# Patient Record
Sex: FEMALE | Race: WHITE | Employment: OTHER | URBAN - METROPOLITAN AREA
[De-identification: names, ages, dates, MRNs, and addresses within clinical notes are randomized per-mention and may not be internally consistent; named-entity substitution may affect disease eponyms.]

---

## 2017-04-26 ENCOUNTER — HOSPITAL ENCOUNTER (OUTPATIENT)
Age: 32
Setting detail: SPECIMEN
Discharge: HOME | End: 2017-04-26
Payer: COMMERCIAL

## 2017-04-28 LAB — DERMATOLOGY PATHOLOGY REPORT: NORMAL

## 2017-05-13 ENCOUNTER — HOSPITAL ENCOUNTER (OUTPATIENT)
Facility: CLINIC | Age: 32
Discharge: HOME | End: 2017-05-13
Payer: COMMERCIAL

## 2017-05-13 LAB
T3 FREE: 4.36 PG/ML (ref 2.02–4.43)
THYROXINE, FREE: 1.53 NG/DL (ref 0.93–1.7)
TSH SERPL DL<=0.05 MIU/L-ACNC: 1.6 MIU/L (ref 0.3–5)

## 2017-05-13 PROCEDURE — 84481 FREE ASSAY (FT-3): CPT

## 2017-05-13 PROCEDURE — 84443 ASSAY THYROID STIM HORMONE: CPT

## 2017-05-13 PROCEDURE — 36415 COLL VENOUS BLD VENIPUNCTURE: CPT

## 2017-05-13 PROCEDURE — 84439 ASSAY OF FREE THYROXINE: CPT

## 2017-07-29 ENCOUNTER — HOSPITAL ENCOUNTER (OUTPATIENT)
Facility: CLINIC | Age: 32
Discharge: HOME | End: 2017-07-29
Payer: COMMERCIAL

## 2017-07-29 LAB
T3 FREE: 3.75 PG/ML (ref 2.02–4.43)
THYROXINE, FREE: 1.55 NG/DL (ref 0.93–1.7)
TSH SERPL DL<=0.05 MIU/L-ACNC: 0.98 MIU/L (ref 0.3–5)

## 2017-07-29 PROCEDURE — 84481 FREE ASSAY (FT-3): CPT

## 2017-07-29 PROCEDURE — 84443 ASSAY THYROID STIM HORMONE: CPT

## 2017-07-29 PROCEDURE — 84439 ASSAY OF FREE THYROXINE: CPT

## 2017-07-29 PROCEDURE — 36415 COLL VENOUS BLD VENIPUNCTURE: CPT

## 2017-08-31 ENCOUNTER — HOSPITAL ENCOUNTER (OUTPATIENT)
Facility: CLINIC | Age: 32
Discharge: HOME | End: 2017-08-31
Payer: COMMERCIAL

## 2017-08-31 LAB
T3 FREE: 4.1 PG/ML (ref 2.02–4.43)
THYROXINE, FREE: 1.72 NG/DL (ref 0.93–1.7)
TSH SERPL DL<=0.05 MIU/L-ACNC: 0.58 MIU/L (ref 0.3–5)

## 2017-08-31 PROCEDURE — 84443 ASSAY THYROID STIM HORMONE: CPT

## 2017-08-31 PROCEDURE — 84439 ASSAY OF FREE THYROXINE: CPT

## 2017-08-31 PROCEDURE — 36415 COLL VENOUS BLD VENIPUNCTURE: CPT

## 2017-08-31 PROCEDURE — 84481 FREE ASSAY (FT-3): CPT

## 2017-11-13 NOTE — PROGRESS NOTES
Subjective:      Patient ID: Primo Anton is a 28 y.o. female. HPI G 2 Presents for annual pap ,  No complaints of abdominal Pain ,  or GI issues , LMp 17  Every 28 days, lasting 4 days  Try for pregnancy spring 2018   Allergy: sulfa, augmentin, codeine,   Med HX: Hyperthyroid, pre eclampsia   Surgery  x 2     Review of Systems   Constitutional: Negative for chills, fatigue and fever. HENT: Negative for sinus pressure, sneezing, sore throat, tinnitus, trouble swallowing and voice change. Respiratory: Negative for cough, choking, shortness of breath, wheezing and stridor. Cardiovascular: Negative for chest pain, palpitations and leg swelling. Gastrointestinal: Negative for abdominal distention, abdominal pain, constipation, diarrhea, nausea and vomiting. Genitourinary: Negative for decreased urine volume, difficulty urinating, dysuria, flank pain, frequency, genital sores, hematuria, menstrual problem, urgency, vaginal bleeding and vaginal discharge. Musculoskeletal: Negative for arthralgias, back pain, gait problem and joint swelling. Skin: Negative for color change, pallor and rash. Neurological: Negative for tremors, seizures, syncope, facial asymmetry, speech difficulty, numbness and headaches. Hematological: Negative for adenopathy. Does not bruise/bleed easily. Psychiatric/Behavioral: Negative for agitation, behavioral problems, confusion, dysphoric mood, self-injury and sleep disturbance. The patient is not nervous/anxious and is not hyperactive. Objective:   Physical Exam   Constitutional: She is oriented to person, place, and time. She appears well-developed and well-nourished. HENT:   Head: Normocephalic. Right Ear: External ear normal.   Left Ear: External ear normal.   Eyes: Conjunctivae are normal. Pupils are equal, round, and reactive to light. Neck: Normal range of motion. Neck supple. No thyromegaly present.    Cardiovascular: Normal rate,

## 2017-11-15 ENCOUNTER — HOSPITAL ENCOUNTER (OUTPATIENT)
Age: 32
Setting detail: SPECIMEN
Discharge: HOME | End: 2017-11-15
Payer: COMMERCIAL

## 2017-11-15 ENCOUNTER — OFFICE VISIT (OUTPATIENT)
Dept: OBGYN CLINIC | Age: 32
End: 2017-11-15
Payer: COMMERCIAL

## 2017-11-15 VITALS
DIASTOLIC BLOOD PRESSURE: 64 MMHG | RESPIRATION RATE: 14 BRPM | WEIGHT: 125 LBS | SYSTOLIC BLOOD PRESSURE: 112 MMHG | HEIGHT: 64 IN | BODY MASS INDEX: 21.34 KG/M2

## 2017-11-15 DIAGNOSIS — Z01.419 PAP SMEAR, AS PART OF ROUTINE GYNECOLOGICAL EXAMINATION: Primary | ICD-10-CM

## 2017-11-15 PROCEDURE — G8427 DOCREV CUR MEDS BY ELIG CLIN: HCPCS | Performed by: NURSE PRACTITIONER

## 2017-11-15 PROCEDURE — 99395 PREV VISIT EST AGE 18-39: CPT | Performed by: NURSE PRACTITIONER

## 2017-11-15 PROCEDURE — G8484 FLU IMMUNIZE NO ADMIN: HCPCS | Performed by: NURSE PRACTITIONER

## 2017-11-15 PROCEDURE — 1036F TOBACCO NON-USER: CPT | Performed by: NURSE PRACTITIONER

## 2017-11-15 PROCEDURE — G8420 CALC BMI NORM PARAMETERS: HCPCS | Performed by: NURSE PRACTITIONER

## 2017-11-15 ASSESSMENT — ENCOUNTER SYMPTOMS
CONSTIPATION: 0
VOMITING: 0
VOICE CHANGE: 0
NAUSEA: 0
COLOR CHANGE: 0
CHOKING: 0
TROUBLE SWALLOWING: 0
COUGH: 0
SINUS PRESSURE: 0
SHORTNESS OF BREATH: 0
WHEEZING: 0
BACK PAIN: 0
ABDOMINAL PAIN: 0
DIARRHEA: 0
SORE THROAT: 0
ABDOMINAL DISTENTION: 0
STRIDOR: 0

## 2017-11-16 LAB
HPV SAMPLE: NORMAL
HPV SOURCE: NORMAL
HPV, GENOTYPE 16: NOT DETECTED
HPV, GENOTYPE 18: NOT DETECTED
HPV, HIGH RISK OTHER: NOT DETECTED
HPV, INTERPRETATION: NORMAL

## 2017-11-22 LAB — CYTOLOGY REPORT: NORMAL

## 2017-12-11 ENCOUNTER — HOSPITAL ENCOUNTER (OUTPATIENT)
Facility: CLINIC | Age: 32
Discharge: HOME | End: 2017-12-11
Payer: COMMERCIAL

## 2017-12-11 LAB
T3 FREE: 3.43 PG/ML (ref 2.02–4.43)
THYROXINE, FREE: 1.62 NG/DL (ref 0.93–1.7)
TSH SERPL DL<=0.05 MIU/L-ACNC: 0.35 MIU/L (ref 0.3–5)

## 2017-12-11 PROCEDURE — 84443 ASSAY THYROID STIM HORMONE: CPT

## 2017-12-11 PROCEDURE — 84439 ASSAY OF FREE THYROXINE: CPT

## 2017-12-11 PROCEDURE — 36415 COLL VENOUS BLD VENIPUNCTURE: CPT

## 2017-12-11 PROCEDURE — 84481 FREE ASSAY (FT-3): CPT

## 2018-04-30 ENCOUNTER — INITIAL PRENATAL (OUTPATIENT)
Dept: OBGYN CLINIC | Age: 33
End: 2018-04-30
Payer: COMMERCIAL

## 2018-04-30 ENCOUNTER — HOSPITAL ENCOUNTER (OUTPATIENT)
Age: 33
Setting detail: SPECIMEN
Discharge: HOME | End: 2018-04-30
Payer: COMMERCIAL

## 2018-04-30 VITALS
DIASTOLIC BLOOD PRESSURE: 70 MMHG | BODY MASS INDEX: 22.31 KG/M2 | HEART RATE: 80 BPM | SYSTOLIC BLOOD PRESSURE: 118 MMHG | WEIGHT: 130 LBS

## 2018-04-30 DIAGNOSIS — E05.90 HYPERTHYROIDISM AFFECTING PREGNANCY IN FIRST TRIMESTER: Primary | ICD-10-CM

## 2018-04-30 DIAGNOSIS — Z3A.09 9 WEEKS GESTATION OF PREGNANCY: ICD-10-CM

## 2018-04-30 DIAGNOSIS — O99.281 HYPERTHYROIDISM AFFECTING PREGNANCY IN FIRST TRIMESTER: Primary | ICD-10-CM

## 2018-04-30 DIAGNOSIS — O09.299 H/O PRE-ECLAMPSIA IN PRIOR PREGNANCY, CURRENTLY PREGNANT: ICD-10-CM

## 2018-04-30 DIAGNOSIS — O34.219 PREVIOUS CESAREAN DELIVERY AFFECTING PREGNANCY, ANTEPARTUM: ICD-10-CM

## 2018-04-30 LAB
DIRECT EXAM: NORMAL
Lab: NORMAL
SPECIMEN DESCRIPTION: NORMAL
STATUS: NORMAL

## 2018-04-30 PROCEDURE — 0500F INITIAL PRENATAL CARE VISIT: CPT | Performed by: NURSE PRACTITIONER

## 2018-05-01 ENCOUNTER — HOSPITAL ENCOUNTER (OUTPATIENT)
Age: 33
Discharge: HOME | End: 2018-05-01
Payer: COMMERCIAL

## 2018-05-01 ENCOUNTER — HOSPITAL ENCOUNTER (OUTPATIENT)
Dept: ULTRASOUND IMAGING | Facility: CLINIC | Age: 33
Discharge: HOME | End: 2018-05-03
Payer: COMMERCIAL

## 2018-05-01 DIAGNOSIS — O99.281 HYPERTHYROIDISM AFFECTING PREGNANCY IN FIRST TRIMESTER: ICD-10-CM

## 2018-05-01 DIAGNOSIS — E05.90 HYPERTHYROIDISM AFFECTING PREGNANCY IN FIRST TRIMESTER: ICD-10-CM

## 2018-05-01 DIAGNOSIS — Z3A.09 9 WEEKS GESTATION OF PREGNANCY: ICD-10-CM

## 2018-05-01 LAB
ABO/RH: NORMAL
ABSOLUTE EOS #: 0.2 K/UL (ref 0–0.44)
ABSOLUTE IMMATURE GRANULOCYTE: 0.06 K/UL (ref 0–0.3)
ABSOLUTE LYMPH #: 1.59 K/UL (ref 1.1–3.7)
ABSOLUTE MONO #: 0.63 K/UL (ref 0.1–1.2)
ANTIBODY SCREEN: NEGATIVE
BASOPHILS # BLD: 0 % (ref 0–2)
BASOPHILS ABSOLUTE: 0.03 K/UL (ref 0–0.2)
BILIRUBIN URINE: NEGATIVE
C TRACH DNA GENITAL QL NAA+PROBE: NEGATIVE
COLOR: YELLOW
COMMENT UA: NORMAL
DIFFERENTIAL TYPE: ABNORMAL
EOSINOPHILS RELATIVE PERCENT: 2 % (ref 1–4)
GLUCOSE BLD-MCNC: 104 MG/DL (ref 70–99)
GLUCOSE URINE: NEGATIVE
HCG QUANTITATIVE: ABNORMAL IU/L
HCT VFR BLD CALC: 38.4 % (ref 36.3–47.1)
HEMOGLOBIN: 12.5 G/DL (ref 11.9–15.1)
HEPATITIS B SURFACE ANTIGEN: NONREACTIVE
HIV AG/AB: NONREACTIVE
IMMATURE GRANULOCYTES: 1 %
KETONES, URINE: NEGATIVE
LEUKOCYTE ESTERASE, URINE: NEGATIVE
LYMPHOCYTES # BLD: 13 % (ref 24–43)
MCH RBC QN AUTO: 31.5 PG (ref 25.2–33.5)
MCHC RBC AUTO-ENTMCNC: 32.6 G/DL (ref 28.4–34.8)
MCV RBC AUTO: 96.7 FL (ref 82.6–102.9)
MONOCYTES # BLD: 5 % (ref 3–12)
N. GONORRHOEAE DNA: NEGATIVE
NITRITE, URINE: NEGATIVE
NRBC AUTOMATED: 0 PER 100 WBC
PDW BLD-RTO: 11.8 % (ref 11.8–14.4)
PH UA: 7 (ref 5–8)
PLATELET # BLD: 381 K/UL (ref 138–453)
PLATELET ESTIMATE: ABNORMAL
PMV BLD AUTO: 9.6 FL (ref 8.1–13.5)
PROTEIN UA: NEGATIVE
RBC # BLD: 3.97 M/UL (ref 3.95–5.11)
RBC # BLD: ABNORMAL 10*6/UL
RUBV IGG SER QL: 139.5 IU/ML
SEG NEUTROPHILS: 79 % (ref 36–65)
SEGMENTED NEUTROPHILS ABSOLUTE COUNT: 9.64 K/UL (ref 1.5–8.1)
SPECIFIC GRAVITY UA: 1.01 (ref 1–1.03)
T. PALLIDUM, IGG: NONREACTIVE
TSH SERPL DL<=0.05 MIU/L-ACNC: 0.03 MIU/L (ref 0.3–5)
TURBIDITY: CLEAR
URINE HGB: NEGATIVE
UROBILINOGEN, URINE: NORMAL
WBC # BLD: 12.2 K/UL (ref 3.5–11.3)
WBC # BLD: ABNORMAL 10*3/UL

## 2018-05-01 PROCEDURE — 84702 CHORIONIC GONADOTROPIN TEST: CPT

## 2018-05-01 PROCEDURE — 87389 HIV-1 AG W/HIV-1&-2 AB AG IA: CPT

## 2018-05-01 PROCEDURE — 86780 TREPONEMA PALLIDUM: CPT

## 2018-05-01 PROCEDURE — 86850 RBC ANTIBODY SCREEN: CPT

## 2018-05-01 PROCEDURE — 85025 COMPLETE CBC W/AUTO DIFF WBC: CPT

## 2018-05-01 PROCEDURE — 81003 URINALYSIS AUTO W/O SCOPE: CPT

## 2018-05-01 PROCEDURE — 86900 BLOOD TYPING SEROLOGIC ABO: CPT

## 2018-05-01 PROCEDURE — 86762 RUBELLA ANTIBODY: CPT

## 2018-05-01 PROCEDURE — 86901 BLOOD TYPING SEROLOGIC RH(D): CPT

## 2018-05-01 PROCEDURE — 36415 COLL VENOUS BLD VENIPUNCTURE: CPT

## 2018-05-01 PROCEDURE — 76801 OB US < 14 WKS SINGLE FETUS: CPT

## 2018-05-01 PROCEDURE — 84443 ASSAY THYROID STIM HORMONE: CPT

## 2018-05-01 PROCEDURE — 87086 URINE CULTURE/COLONY COUNT: CPT

## 2018-05-01 PROCEDURE — 87340 HEPATITIS B SURFACE AG IA: CPT

## 2018-05-01 PROCEDURE — 82947 ASSAY GLUCOSE BLOOD QUANT: CPT

## 2018-05-02 ENCOUNTER — TELEPHONE (OUTPATIENT)
Dept: OBGYN CLINIC | Age: 33
End: 2018-05-02

## 2018-05-02 LAB
CULTURE: NO GROWTH
CULTURE: NORMAL
Lab: NORMAL
SPECIMEN DESCRIPTION: NORMAL
STATUS: NORMAL

## 2018-05-03 LAB
CULTURE: NORMAL
CULTURE: NORMAL
Lab: NORMAL
SPECIMEN DESCRIPTION: NORMAL
STATUS: NORMAL

## 2018-05-24 ENCOUNTER — ROUTINE PRENATAL (OUTPATIENT)
Dept: OBGYN CLINIC | Age: 33
End: 2018-05-24

## 2018-05-24 VITALS
SYSTOLIC BLOOD PRESSURE: 116 MMHG | WEIGHT: 134 LBS | DIASTOLIC BLOOD PRESSURE: 70 MMHG | HEART RATE: 80 BPM | BODY MASS INDEX: 23 KG/M2

## 2018-05-24 DIAGNOSIS — Z3A.13 13 WEEKS GESTATION OF PREGNANCY: ICD-10-CM

## 2018-05-24 DIAGNOSIS — E07.9 THYROID DISEASE DURING PREGNANCY IN SECOND TRIMESTER: ICD-10-CM

## 2018-05-24 DIAGNOSIS — Z34.82 NORMAL PREGNANCY IN MULTIGRAVIDA IN SECOND TRIMESTER: Primary | ICD-10-CM

## 2018-05-24 DIAGNOSIS — O99.282 THYROID DISEASE DURING PREGNANCY IN SECOND TRIMESTER: ICD-10-CM

## 2018-05-24 PROCEDURE — G8427 DOCREV CUR MEDS BY ELIG CLIN: HCPCS | Performed by: NURSE PRACTITIONER

## 2018-05-24 PROCEDURE — 0502F SUBSEQUENT PRENATAL CARE: CPT | Performed by: NURSE PRACTITIONER

## 2018-05-24 PROCEDURE — G8420 CALC BMI NORM PARAMETERS: HCPCS | Performed by: NURSE PRACTITIONER

## 2018-05-24 PROCEDURE — 1036F TOBACCO NON-USER: CPT | Performed by: NURSE PRACTITIONER

## 2018-06-11 ENCOUNTER — HOSPITAL ENCOUNTER (OUTPATIENT)
Facility: CLINIC | Age: 33
Discharge: HOME | End: 2018-06-11
Payer: COMMERCIAL

## 2018-06-11 LAB
T3 FREE: 2.84 PG/ML (ref 2.02–4.43)
THYROXINE, FREE: 1.42 NG/DL (ref 0.93–1.7)
TSH SERPL DL<=0.05 MIU/L-ACNC: 0.37 MIU/L (ref 0.3–5)

## 2018-06-11 PROCEDURE — 84481 FREE ASSAY (FT-3): CPT

## 2018-06-11 PROCEDURE — 84443 ASSAY THYROID STIM HORMONE: CPT

## 2018-06-11 PROCEDURE — 36415 COLL VENOUS BLD VENIPUNCTURE: CPT

## 2018-06-11 PROCEDURE — 84439 ASSAY OF FREE THYROXINE: CPT

## 2018-06-18 ENCOUNTER — ROUTINE PRENATAL (OUTPATIENT)
Dept: OBGYN CLINIC | Age: 33
End: 2018-06-18

## 2018-06-18 VITALS — DIASTOLIC BLOOD PRESSURE: 72 MMHG | WEIGHT: 138 LBS | SYSTOLIC BLOOD PRESSURE: 118 MMHG | BODY MASS INDEX: 23.69 KG/M2

## 2018-06-18 DIAGNOSIS — Z3A.16 16 WEEKS GESTATION OF PREGNANCY: ICD-10-CM

## 2018-06-18 DIAGNOSIS — Z34.82 NORMAL PREGNANCY IN MULTIGRAVIDA IN SECOND TRIMESTER: Primary | ICD-10-CM

## 2018-06-18 PROCEDURE — 0502F SUBSEQUENT PRENATAL CARE: CPT | Performed by: OBSTETRICS & GYNECOLOGY

## 2018-07-17 ENCOUNTER — ROUTINE PRENATAL (OUTPATIENT)
Dept: PERINATAL CARE | Age: 33
End: 2018-07-17
Payer: COMMERCIAL

## 2018-07-17 VITALS
RESPIRATION RATE: 16 BRPM | BODY MASS INDEX: 24.84 KG/M2 | HEART RATE: 103 BPM | WEIGHT: 145.5 LBS | DIASTOLIC BLOOD PRESSURE: 75 MMHG | SYSTOLIC BLOOD PRESSURE: 120 MMHG | HEIGHT: 64 IN | TEMPERATURE: 98.3 F

## 2018-07-17 DIAGNOSIS — Z3A.20 20 WEEKS GESTATION OF PREGNANCY: ICD-10-CM

## 2018-07-17 DIAGNOSIS — O34.219 PREVIOUS CESAREAN DELIVERY, ANTEPARTUM CONDITION OR COMPLICATION: ICD-10-CM

## 2018-07-17 DIAGNOSIS — O09.292 HX OF PREECLAMPSIA, PRIOR PREGNANCY, CURRENTLY PREGNANT, SECOND TRIMESTER: ICD-10-CM

## 2018-07-17 DIAGNOSIS — Z36.86 ENCOUNTER FOR SCREENING FOR RISK OF PRE-TERM LABOR: ICD-10-CM

## 2018-07-17 DIAGNOSIS — O44.40 LOW IMPLANTATION OF PLACENTA WITHOUT HEMORRHAGE: Primary | ICD-10-CM

## 2018-07-17 PROCEDURE — 76817 TRANSVAGINAL US OBSTETRIC: CPT | Performed by: OBSTETRICS & GYNECOLOGY

## 2018-07-17 PROCEDURE — 76811 OB US DETAILED SNGL FETUS: CPT | Performed by: OBSTETRICS & GYNECOLOGY

## 2018-07-18 ENCOUNTER — ROUTINE PRENATAL (OUTPATIENT)
Dept: OBGYN CLINIC | Age: 33
End: 2018-07-18

## 2018-07-18 VITALS
DIASTOLIC BLOOD PRESSURE: 62 MMHG | BODY MASS INDEX: 24.95 KG/M2 | SYSTOLIC BLOOD PRESSURE: 108 MMHG | WEIGHT: 145.38 LBS

## 2018-07-18 DIAGNOSIS — Z3A.20 20 WEEKS GESTATION OF PREGNANCY: ICD-10-CM

## 2018-07-18 DIAGNOSIS — O44.40 LOW IMPLANTATION OF PLACENTA WITHOUT HEMORRHAGE: ICD-10-CM

## 2018-07-18 DIAGNOSIS — Z34.82 ENCOUNTER FOR SUPERVISION OF OTHER NORMAL PREGNANCY IN SECOND TRIMESTER: Primary | ICD-10-CM

## 2018-07-18 PROCEDURE — 0502F SUBSEQUENT PRENATAL CARE: CPT | Performed by: OBSTETRICS & GYNECOLOGY

## 2018-08-13 ENCOUNTER — ROUTINE PRENATAL (OUTPATIENT)
Dept: OBGYN CLINIC | Age: 33
End: 2018-08-13

## 2018-08-13 VITALS — WEIGHT: 154 LBS | BODY MASS INDEX: 26.43 KG/M2 | SYSTOLIC BLOOD PRESSURE: 110 MMHG | DIASTOLIC BLOOD PRESSURE: 62 MMHG

## 2018-08-13 DIAGNOSIS — Z34.82 NORMAL PREGNANCY IN MULTIGRAVIDA IN SECOND TRIMESTER: Primary | ICD-10-CM

## 2018-08-13 DIAGNOSIS — Z3A.24 24 WEEKS GESTATION OF PREGNANCY: ICD-10-CM

## 2018-08-13 PROCEDURE — 0502F SUBSEQUENT PRENATAL CARE: CPT | Performed by: OBSTETRICS & GYNECOLOGY

## 2018-08-13 NOTE — PROGRESS NOTES
Lukasz Richardson is a 28 y.o. female 24w4d        OB History    Para Term  AB Living   3 2 2 0 0 2   SAB TAB Ectopic Molar Multiple Live Births   0 0 0   0 2      # Outcome Date GA Lbr Usman/2nd Weight Sex Delivery Anes PTL Lv   3 Current            2 Term 02/03/15 38w0d  7 lb 12.5 oz (3.53 kg) M CS-LTranv Spinal  MARLEN   1 Term 2014 39w0d  8 lb (3.629 kg) F CS-LTranv  N MARLEN          Vitals  BP: 110/62  Weight: 154 lb (69.9 kg)  Patient Position: Sitting  Albumin: Negative  Glucose: Negative    The patient was seen and evaluated. There was positive fetal movements. No contractions or leakage of fluid. Signs and symptoms of  labor as well as labor were reviewed. The patients anatomy ultrasound has been completed and reviewed with patient. TOP ST OH Reviewed. A 28 week lab panel was ordered. This includes a (HH, 1 hr GTT, U/A C&S). The patient is to complete this in the next two to four weeks. The S/S of Pre-Eclampsia were reviewed with the patient in detail. She is to report any of these if they occur. She currently denies any of these. The patient is RH positive Rhogam Ordered no    The patient was instructed on fetal kick counts and was given a kick sheet to complete every 8 hours. This is to begin at 28 weeks gestation. She was instructed that the baby should move at a minimum of ten times within one hour after a meal. The patient was instructed to lay down on her left side twenty minutes after eating and count movements for up to one hour with a target value of ten movements. She was instructed to notify the office if she did not make that target after two attempts or if after any attempt there was less than four movements. No Patient Care Coordination Note on file. Assessment:  1. Lukasz Richardson is a 28 y.o. female  2.  [id number]  3. 24w4d    Patient Active Problem List    Diagnosis Date Noted    Preeclampsia 2015     Priority: High    S/P RLTCS 2/3/15 M

## 2018-09-11 ENCOUNTER — HOSPITAL ENCOUNTER (OUTPATIENT)
Facility: CLINIC | Age: 33
Discharge: HOME | End: 2018-09-11
Payer: COMMERCIAL

## 2018-09-11 DIAGNOSIS — Z34.82 NORMAL PREGNANCY IN MULTIGRAVIDA IN SECOND TRIMESTER: ICD-10-CM

## 2018-09-11 DIAGNOSIS — Z3A.24 24 WEEKS GESTATION OF PREGNANCY: ICD-10-CM

## 2018-09-11 PROBLEM — O99.013 ANEMIA DURING PREGNANCY IN THIRD TRIMESTER: Status: ACTIVE | Noted: 2018-09-11

## 2018-09-11 LAB
ABSOLUTE EOS #: 0.28 K/UL (ref 0–0.44)
ABSOLUTE IMMATURE GRANULOCYTE: 0.08 K/UL (ref 0–0.3)
ABSOLUTE LYMPH #: 1.57 K/UL (ref 1.1–3.7)
ABSOLUTE MONO #: 0.47 K/UL (ref 0.1–1.2)
BASOPHILS # BLD: 0 % (ref 0–2)
BASOPHILS ABSOLUTE: <0.03 K/UL (ref 0–0.2)
DIFFERENTIAL TYPE: ABNORMAL
EOSINOPHILS RELATIVE PERCENT: 3 % (ref 1–4)
GLUCOSE ADMINISTRATION: NORMAL
GLUCOSE TOLERANCE SCREEN 50G: 130 MG/DL (ref 70–135)
HCT VFR BLD CALC: 31.6 % (ref 36.3–47.1)
HEMOGLOBIN: 10.1 G/DL (ref 11.9–15.1)
IMMATURE GRANULOCYTES: 1 %
LYMPHOCYTES # BLD: 19 % (ref 24–43)
MCH RBC QN AUTO: 31.4 PG (ref 25.2–33.5)
MCHC RBC AUTO-ENTMCNC: 32 G/DL (ref 28.4–34.8)
MCV RBC AUTO: 98.1 FL (ref 82.6–102.9)
MONOCYTES # BLD: 6 % (ref 3–12)
NRBC AUTOMATED: 0 PER 100 WBC
PDW BLD-RTO: 12.3 % (ref 11.8–14.4)
PLATELET # BLD: 300 K/UL (ref 138–453)
PLATELET ESTIMATE: ABNORMAL
PMV BLD AUTO: 10.2 FL (ref 8.1–13.5)
RBC # BLD: 3.22 M/UL (ref 3.95–5.11)
RBC # BLD: ABNORMAL 10*6/UL
SEG NEUTROPHILS: 71 % (ref 36–65)
SEGMENTED NEUTROPHILS ABSOLUTE COUNT: 6.08 K/UL (ref 1.5–8.1)
TSH SERPL DL<=0.05 MIU/L-ACNC: 0.73 MIU/L (ref 0.3–5)
WBC # BLD: 8.5 K/UL (ref 3.5–11.3)
WBC # BLD: ABNORMAL 10*3/UL

## 2018-09-11 PROCEDURE — 36415 COLL VENOUS BLD VENIPUNCTURE: CPT

## 2018-09-11 PROCEDURE — 85025 COMPLETE CBC W/AUTO DIFF WBC: CPT

## 2018-09-11 PROCEDURE — 84443 ASSAY THYROID STIM HORMONE: CPT

## 2018-09-11 PROCEDURE — 82950 GLUCOSE TEST: CPT

## 2018-09-12 ENCOUNTER — TELEPHONE (OUTPATIENT)
Dept: OBGYN CLINIC | Age: 33
End: 2018-09-12

## 2018-09-12 RX ORDER — LANOLIN ALCOHOL/MO/W.PET/CERES
325 CREAM (GRAM) TOPICAL 2 TIMES DAILY WITH MEALS
Qty: 60 TABLET | Refills: 3 | Status: SHIPPED | OUTPATIENT
Start: 2018-09-12 | End: 2018-10-25

## 2018-09-20 ENCOUNTER — ROUTINE PRENATAL (OUTPATIENT)
Dept: OBGYN CLINIC | Age: 33
End: 2018-09-20

## 2018-09-20 VITALS
BODY MASS INDEX: 27.12 KG/M2 | DIASTOLIC BLOOD PRESSURE: 62 MMHG | HEART RATE: 80 BPM | WEIGHT: 158 LBS | SYSTOLIC BLOOD PRESSURE: 116 MMHG

## 2018-09-20 DIAGNOSIS — O99.013 ANEMIA DURING PREGNANCY IN THIRD TRIMESTER: ICD-10-CM

## 2018-09-20 DIAGNOSIS — O09.293 HX OF PREECLAMPSIA, PRIOR PREGNANCY, CURRENTLY PREGNANT, THIRD TRIMESTER: ICD-10-CM

## 2018-09-20 DIAGNOSIS — Z34.83 ENCOUNTER FOR SUPERVISION OF NORMAL PREGNANCY IN MULTIGRAVIDA IN THIRD TRIMESTER: Primary | ICD-10-CM

## 2018-09-20 DIAGNOSIS — Z3A.30 30 WEEKS GESTATION OF PREGNANCY: ICD-10-CM

## 2018-09-20 PROCEDURE — 1036F TOBACCO NON-USER: CPT | Performed by: NURSE PRACTITIONER

## 2018-09-20 PROCEDURE — 0502F SUBSEQUENT PRENATAL CARE: CPT | Performed by: NURSE PRACTITIONER

## 2018-09-20 PROCEDURE — G8427 DOCREV CUR MEDS BY ELIG CLIN: HCPCS | Performed by: NURSE PRACTITIONER

## 2018-09-20 PROCEDURE — G8419 CALC BMI OUT NRM PARAM NOF/U: HCPCS | Performed by: NURSE PRACTITIONER

## 2018-09-20 NOTE — PROGRESS NOTES
G 3 P 2   EDC 18, 30 weeks gestation presents for ob visit.  H/o 2 prior c-sections, , Hyperthyroid   Allergy Augmentin, Sulfa, Codeine   Med HX  Hyperthyroid, Pre-eclampsia   Surgery  x 2     She deneis bleeding, discharge ,dysurai headaches, heartburn, epigastric pain, nausea, contractions, leakage of fluid   + fetal movement   Kick Count target met     Q&A varicose veins rt leg,   & perineum    Compression hose   Increase lying down time         RV prn/ 3 week

## 2018-10-09 ENCOUNTER — ROUTINE PRENATAL (OUTPATIENT)
Dept: OBGYN CLINIC | Age: 33
End: 2018-10-09

## 2018-10-09 VITALS
DIASTOLIC BLOOD PRESSURE: 62 MMHG | BODY MASS INDEX: 27.64 KG/M2 | WEIGHT: 161 LBS | HEART RATE: 88 BPM | SYSTOLIC BLOOD PRESSURE: 110 MMHG

## 2018-10-09 DIAGNOSIS — O99.013 ANEMIA DURING PREGNANCY IN THIRD TRIMESTER: ICD-10-CM

## 2018-10-09 DIAGNOSIS — Z3A.32 32 WEEKS GESTATION OF PREGNANCY: ICD-10-CM

## 2018-10-09 DIAGNOSIS — O09.293 HX OF PREECLAMPSIA, PRIOR PREGNANCY, CURRENTLY PREGNANT, THIRD TRIMESTER: ICD-10-CM

## 2018-10-09 DIAGNOSIS — Z34.83 ENCOUNTER FOR SUPERVISION OF NORMAL PREGNANCY IN MULTIGRAVIDA IN THIRD TRIMESTER: Primary | ICD-10-CM

## 2018-10-09 PROCEDURE — G8427 DOCREV CUR MEDS BY ELIG CLIN: HCPCS | Performed by: NURSE PRACTITIONER

## 2018-10-09 PROCEDURE — G8484 FLU IMMUNIZE NO ADMIN: HCPCS | Performed by: NURSE PRACTITIONER

## 2018-10-09 PROCEDURE — G8419 CALC BMI OUT NRM PARAM NOF/U: HCPCS | Performed by: NURSE PRACTITIONER

## 2018-10-09 PROCEDURE — 0502F SUBSEQUENT PRENATAL CARE: CPT | Performed by: NURSE PRACTITIONER

## 2018-10-09 PROCEDURE — 1036F TOBACCO NON-USER: CPT | Performed by: NURSE PRACTITIONER

## 2018-10-25 ENCOUNTER — ROUTINE PRENATAL (OUTPATIENT)
Dept: OBGYN CLINIC | Age: 33
End: 2018-10-25
Payer: COMMERCIAL

## 2018-10-25 VITALS — DIASTOLIC BLOOD PRESSURE: 60 MMHG | BODY MASS INDEX: 27.64 KG/M2 | SYSTOLIC BLOOD PRESSURE: 120 MMHG | WEIGHT: 161 LBS

## 2018-10-25 DIAGNOSIS — Z23 NEED FOR TDAP VACCINATION: Primary | ICD-10-CM

## 2018-10-25 DIAGNOSIS — Z23 FLU VACCINE NEED: ICD-10-CM

## 2018-10-25 DIAGNOSIS — O09.293 HX OF PREECLAMPSIA, PRIOR PREGNANCY, CURRENTLY PREGNANT, THIRD TRIMESTER: ICD-10-CM

## 2018-10-25 DIAGNOSIS — O99.013 ANEMIA DURING PREGNANCY IN THIRD TRIMESTER: ICD-10-CM

## 2018-10-25 DIAGNOSIS — Z34.83 ENCOUNTER FOR SUPERVISION OF NORMAL PREGNANCY IN MULTIGRAVIDA IN THIRD TRIMESTER: ICD-10-CM

## 2018-10-25 DIAGNOSIS — Z98.891 HISTORY OF C-SECTION: ICD-10-CM

## 2018-10-25 DIAGNOSIS — Z3A.35 35 WEEKS GESTATION OF PREGNANCY: ICD-10-CM

## 2018-10-25 PROCEDURE — 1036F TOBACCO NON-USER: CPT | Performed by: OBSTETRICS & GYNECOLOGY

## 2018-10-25 PROCEDURE — 90715 TDAP VACCINE 7 YRS/> IM: CPT | Performed by: OBSTETRICS & GYNECOLOGY

## 2018-10-25 PROCEDURE — G8419 CALC BMI OUT NRM PARAM NOF/U: HCPCS | Performed by: OBSTETRICS & GYNECOLOGY

## 2018-10-25 PROCEDURE — G8427 DOCREV CUR MEDS BY ELIG CLIN: HCPCS | Performed by: OBSTETRICS & GYNECOLOGY

## 2018-10-25 PROCEDURE — 90472 IMMUNIZATION ADMIN EACH ADD: CPT | Performed by: OBSTETRICS & GYNECOLOGY

## 2018-10-25 PROCEDURE — 90471 IMMUNIZATION ADMIN: CPT | Performed by: OBSTETRICS & GYNECOLOGY

## 2018-10-25 PROCEDURE — 90688 IIV4 VACCINE SPLT 0.5 ML IM: CPT | Performed by: OBSTETRICS & GYNECOLOGY

## 2018-10-25 PROCEDURE — 0502F SUBSEQUENT PRENATAL CARE: CPT | Performed by: OBSTETRICS & GYNECOLOGY

## 2018-10-25 PROCEDURE — G8482 FLU IMMUNIZE ORDER/ADMIN: HCPCS | Performed by: OBSTETRICS & GYNECOLOGY

## 2018-10-25 RX ORDER — FERROUS SULFATE 325(65) MG
TABLET ORAL
Refills: 3 | Status: ON HOLD | COMMUNITY
Start: 2018-09-12 | End: 2018-11-18 | Stop reason: HOSPADM

## 2018-10-25 NOTE — PROGRESS NOTES
Diagnosis Orders   1. Need for Tdap vaccination  MS INJECTION,THERAP/PROPH/DIAGNOST, IM OR SUBCUT    Tdap (age 6y and older) IM ([address])   2. Flu vaccine need  MS INJECTION,THERAP/PROPH/DIAGNOST, IM OR SUBCUT    INFLUENZA, QUADV, 3 YRS AND OLDER, IM, MDV, 0.5ML (FLUZONE QUADV)   3. 35 weeks gestation of pregnancy  MS INJECTION,THERAP/PROPH/DIAGNOST, IM OR SUBCUT    MS INJECTION,THERAP/PROPH/DIAGNOST, IM OR SUBCUT    Tdap (age 6y and older) IM (BOOSTRIX)    INFLUENZA, QUADV, 3 YRS AND OLDER, IM, MDV, 0.5ML (FLUZONE QUADV)   4. Hx of preeclampsia, prior pregnancy, currently pregnant, third trimester     5. Encounter for supervision of normal pregnancy in multigravida in third trimester     6. Anemia during pregnancy in third trimester     7. History of        Plan:  The patient will return to the office for her next visit in 2 weeks. See antepartum flow sheet. Counseled on s/s of preeclampsia. Counseled on s/s of  labor. [address] discussed in detail  Plan on repeat c/s at 39 weeks. History of 38 week delivery for gest HTN.

## 2018-11-05 ENCOUNTER — ROUTINE PRENATAL (OUTPATIENT)
Dept: PERINATAL CARE | Age: 33
End: 2018-11-05
Payer: COMMERCIAL

## 2018-11-05 VITALS
RESPIRATION RATE: 18 BRPM | BODY MASS INDEX: 27.49 KG/M2 | TEMPERATURE: 98.8 F | SYSTOLIC BLOOD PRESSURE: 107 MMHG | HEART RATE: 88 BPM | DIASTOLIC BLOOD PRESSURE: 68 MMHG | WEIGHT: 161 LBS | HEIGHT: 64 IN

## 2018-11-05 DIAGNOSIS — O36.60X0 EXCESSIVE FETAL GROWTH AFFECTING PREGNANCY, ANTEPARTUM, SINGLE OR UNSPECIFIED FETUS: ICD-10-CM

## 2018-11-05 DIAGNOSIS — Z03.72 SUSPECTED PLACENTAL PROBLEM NOT FOUND: ICD-10-CM

## 2018-11-05 DIAGNOSIS — Z3A.36 36 WEEKS GESTATION OF PREGNANCY: ICD-10-CM

## 2018-11-05 DIAGNOSIS — Z13.89 ENCOUNTER FOR ROUTINE SCREENING FOR MALFORMATION USING ULTRASONICS: ICD-10-CM

## 2018-11-05 DIAGNOSIS — O44.40 LOW IMPLANTATION OF PLACENTA WITHOUT HEMORRHAGE: Primary | ICD-10-CM

## 2018-11-05 DIAGNOSIS — O09.293 HX OF PREECLAMPSIA, PRIOR PREGNANCY, CURRENTLY PREGNANT, THIRD TRIMESTER: ICD-10-CM

## 2018-11-05 PROCEDURE — 76819 FETAL BIOPHYS PROFIL W/O NST: CPT | Performed by: OBSTETRICS & GYNECOLOGY

## 2018-11-05 PROCEDURE — 76805 OB US >/= 14 WKS SNGL FETUS: CPT | Performed by: OBSTETRICS & GYNECOLOGY

## 2018-11-05 PROCEDURE — 76817 TRANSVAGINAL US OBSTETRIC: CPT | Performed by: OBSTETRICS & GYNECOLOGY

## 2018-11-07 ENCOUNTER — HOSPITAL ENCOUNTER (OUTPATIENT)
Age: 33
Setting detail: SPECIMEN
Discharge: HOME | End: 2018-11-07
Payer: COMMERCIAL

## 2018-11-07 ENCOUNTER — ROUTINE PRENATAL (OUTPATIENT)
Dept: OBGYN CLINIC | Age: 33
End: 2018-11-07

## 2018-11-07 VITALS — DIASTOLIC BLOOD PRESSURE: 62 MMHG | WEIGHT: 163 LBS | BODY MASS INDEX: 27.98 KG/M2 | SYSTOLIC BLOOD PRESSURE: 116 MMHG

## 2018-11-07 DIAGNOSIS — Z3A.36 36 WEEKS GESTATION OF PREGNANCY: ICD-10-CM

## 2018-11-07 DIAGNOSIS — O09.293 HX OF PREECLAMPSIA, PRIOR PREGNANCY, CURRENTLY PREGNANT, THIRD TRIMESTER: ICD-10-CM

## 2018-11-07 DIAGNOSIS — O36.60X0 EXCESSIVE FETAL GROWTH AFFECTING PREGNANCY, ANTEPARTUM, SINGLE OR UNSPECIFIED FETUS: Primary | ICD-10-CM

## 2018-11-07 DIAGNOSIS — Z34.83 ENCOUNTER FOR SUPERVISION OF NORMAL PREGNANCY IN MULTIGRAVIDA IN THIRD TRIMESTER: ICD-10-CM

## 2018-11-07 PROCEDURE — G8482 FLU IMMUNIZE ORDER/ADMIN: HCPCS | Performed by: NURSE PRACTITIONER

## 2018-11-07 PROCEDURE — 1036F TOBACCO NON-USER: CPT | Performed by: NURSE PRACTITIONER

## 2018-11-07 PROCEDURE — G8419 CALC BMI OUT NRM PARAM NOF/U: HCPCS | Performed by: NURSE PRACTITIONER

## 2018-11-07 PROCEDURE — G8427 DOCREV CUR MEDS BY ELIG CLIN: HCPCS | Performed by: NURSE PRACTITIONER

## 2018-11-07 PROCEDURE — 0502F SUBSEQUENT PRENATAL CARE: CPT | Performed by: NURSE PRACTITIONER

## 2018-11-10 LAB
CULTURE: NORMAL
Lab: NORMAL
SPECIMEN DESCRIPTION: NORMAL
STATUS: NORMAL

## 2018-11-12 ENCOUNTER — ROUTINE PRENATAL (OUTPATIENT)
Dept: OBGYN CLINIC | Age: 33
End: 2018-11-12

## 2018-11-12 VITALS — SYSTOLIC BLOOD PRESSURE: 112 MMHG | BODY MASS INDEX: 28.15 KG/M2 | WEIGHT: 164 LBS | DIASTOLIC BLOOD PRESSURE: 64 MMHG

## 2018-11-12 DIAGNOSIS — O36.60X0 EXCESSIVE FETAL GROWTH AFFECTING PREGNANCY, ANTEPARTUM, SINGLE OR UNSPECIFIED FETUS: ICD-10-CM

## 2018-11-12 DIAGNOSIS — Z34.83 ENCOUNTER FOR SUPERVISION OF NORMAL PREGNANCY IN MULTIGRAVIDA IN THIRD TRIMESTER: Primary | ICD-10-CM

## 2018-11-12 PROCEDURE — 0502F SUBSEQUENT PRENATAL CARE: CPT | Performed by: OBSTETRICS & GYNECOLOGY

## 2018-11-12 NOTE — PROGRESS NOTES
Juma Crisostomo is a 35 y.o. female 37w4d        OB History    Para Term  AB Living   3 2 2 0 0 2   SAB TAB Ectopic Molar Multiple Live Births   0 0 0   0 2      # Outcome Date GA Lbr Usman/2nd Weight Sex Delivery Anes PTL Lv   3 Current            2 Term 02/03/15 38w0d  7 lb 12.5 oz (3.53 kg) M CS-LTranv Spinal  MARLEN   1 Term 2014 39w0d  8 lb (3.629 kg) F CS-LTranv  N MARLEN          Vitals  BP: 112/64  Weight: 164 lb (74.4 kg)  Patient Position: Sitting  Albumin: Negative  Glucose: Negative      The patient was seen and evaluated. There was positive fetal movements. No contractions or leakage of fluid. Signs and symptoms of labor were reviewed. The S/S of Pre-Eclampsia were reviewed with the patient in detail. She is to report any of these if they occur. She currently denies any of these. The patient was instructed on fetal kick counts and was given a kick sheet to complete every 8 hours. She was instructed that the baby should move at a minimum of ten times within one hour after a meal. The patient was instructed to lay down on her left side twenty minutes after eating and count movements for up to one hour with a target value of ten movements. She was instructed to notify the office if she did not make that target after two attempts or if after any attempt there was less than four movements. The patient reports that the targets have been made Yes. Flu and tdap given 10/25/18      T-Dap Vaccine Completed (27-36 weeks): Yes    Allergies: Allergies as of 2018 - Review Complete 2018   Allergen Reaction Noted    Amoxicillin-pot clavulanate  2013    Codeine Rash 2015    Sulfa antibiotics Rash 2013         Group Beta Strep collection was completed. No  GBS Results:   Hospital Outpatient Visit on 2018   Component Date Value Ref Range Status    Specimen Description 2018 . VAGINAL SPECIMEN   Final    Special Requests 2018 NOT REPORTED preeclampsia, prior pregnancy, currently pregnant, second trimester 07/17/2018    Normal pregnancy in multigravida in second trimester 05/24/2018    Preeclampsia in postpartum period 02/04/2015    Elv 1 hr, nml 3 hr 01/05/2015     Overview Note:     Normal 3hr GTT      Persistent R umbilical vein 83/58/6341     Overview Note:     Persistent R umbilical vein      Prior CS x 1  10/01/2014    Encounter for supervision of other normal pregnancy 09/04/2014     Overview Note:     Replacing Inactive Diagnoses      Short interval preg 08/07/2014    Adnexal cyst 10/25/2013     Overview Note:     L      Prior large fetal biometric parameters 08/22/2013        Diagnosis Orders   1. Encounter for supervision of normal pregnancy in multigravida in third trimester     2. Excessive fetal growth affecting pregnancy, antepartum, single or unspecified fetus             Plan:  The patient will return to the office for her next visit in 4 days. See antepartum flow sheet. LIBORIO 7.72, BPP 8/8, EFW 7#2  Repeat c/s scheduled at 39w0d unless indicated prior  Counseled on s/s of labor. Counseled on [address] in detail.

## 2018-11-16 ENCOUNTER — HOSPITAL ENCOUNTER (INPATIENT)
Age: 33
LOS: 1 days | Discharge: ANOTHER HOSPITAL | End: 2018-11-17
Attending: OBSTETRICS & GYNECOLOGY | Admitting: OBSTETRICS & GYNECOLOGY
Payer: COMMERCIAL

## 2018-11-16 ENCOUNTER — ROUTINE PRENATAL (OUTPATIENT)
Dept: OBGYN CLINIC | Age: 33
End: 2018-11-16

## 2018-11-16 ENCOUNTER — ANESTHESIA EVENT (OUTPATIENT)
Dept: LABOR AND DELIVERY | Age: 33
End: 2018-11-16
Payer: COMMERCIAL

## 2018-11-16 ENCOUNTER — ANESTHESIA (OUTPATIENT)
Dept: LABOR AND DELIVERY | Age: 33
End: 2018-11-16
Payer: COMMERCIAL

## 2018-11-16 VITALS — DIASTOLIC BLOOD PRESSURE: 64 MMHG | SYSTOLIC BLOOD PRESSURE: 118 MMHG | WEIGHT: 165 LBS | BODY MASS INDEX: 28.32 KG/M2

## 2018-11-16 VITALS — DIASTOLIC BLOOD PRESSURE: 66 MMHG | SYSTOLIC BLOOD PRESSURE: 109 MMHG | OXYGEN SATURATION: 100 %

## 2018-11-16 DIAGNOSIS — Z3A.38 38 WEEKS GESTATION OF PREGNANCY: ICD-10-CM

## 2018-11-16 DIAGNOSIS — Z34.83 NORMAL PREGNANCY IN MULTIGRAVIDA IN THIRD TRIMESTER: Primary | ICD-10-CM

## 2018-11-16 DIAGNOSIS — O36.60X0 EXCESSIVE FETAL GROWTH AFFECTING PREGNANCY, ANTEPARTUM, SINGLE OR UNSPECIFIED FETUS: ICD-10-CM

## 2018-11-16 PROBLEM — Z91.89 AT RISK FOR IMPAIRED PARENT-INFANT BONDING: Status: ACTIVE | Noted: 2018-11-16

## 2018-11-16 PROBLEM — O41.00X0 OLIGOHYDRAMNIOS: Status: ACTIVE | Noted: 2018-11-16

## 2018-11-16 PROBLEM — E05.90 HYPERTHYROIDISM: Status: ACTIVE | Noted: 2018-11-16

## 2018-11-16 PROBLEM — O09.93 HRP (HIGH RISK PREGNANCY), THIRD TRIMESTER: Status: ACTIVE | Noted: 2018-11-16

## 2018-11-16 LAB
-: ABNORMAL
ABO/RH: NORMAL
AMORPHOUS: ABNORMAL
AMPHETAMINE SCREEN URINE: NEGATIVE
ANTIBODY SCREEN: NEGATIVE
ARM BAND NUMBER: NORMAL
BACTERIA: ABNORMAL
BARBITURATE SCREEN URINE: NEGATIVE
BENZODIAZEPINE SCREEN, URINE: NEGATIVE
BILIRUBIN URINE: NEGATIVE
BUPRENORPHINE URINE: NORMAL
CANNABINOID SCREEN URINE: NEGATIVE
CASTS UA: ABNORMAL /LPF
COCAINE METABOLITE, URINE: NEGATIVE
COLOR: YELLOW
COMMENT UA: ABNORMAL
CRYSTALS, UA: ABNORMAL /HPF
EPITHELIAL CELLS UA: ABNORMAL /HPF
EXPIRATION DATE: NORMAL
GLUCOSE URINE: NEGATIVE
HCT VFR BLD CALC: 33.9 % (ref 36–46)
HEMOGLOBIN: 11.6 G/DL (ref 12–16)
KETONES, URINE: NEGATIVE
LEUKOCYTE ESTERASE, URINE: ABNORMAL
MCH RBC QN AUTO: 31.6 PG (ref 26–34)
MCHC RBC AUTO-ENTMCNC: 34.2 G/DL (ref 31–37)
MCV RBC AUTO: 92.4 FL (ref 80–100)
MDMA URINE: NORMAL
METHADONE SCREEN, URINE: NEGATIVE
METHAMPHETAMINE, URINE: NORMAL
MUCUS: ABNORMAL
NITRITE, URINE: NEGATIVE
NRBC AUTOMATED: ABNORMAL PER 100 WBC
OPIATES, URINE: NEGATIVE
OTHER OBSERVATIONS UA: ABNORMAL
OXYCODONE SCREEN URINE: NEGATIVE
PDW BLD-RTO: 16.2 % (ref 11.5–14.9)
PH UA: 7 (ref 5–8)
PHENCYCLIDINE, URINE: NEGATIVE
PLATELET # BLD: 281 K/UL (ref 150–450)
PMV BLD AUTO: 8.5 FL (ref 6–12)
PROPOXYPHENE, URINE: NORMAL
PROTEIN UA: NEGATIVE
RBC # BLD: 3.67 M/UL (ref 4–5.2)
RBC UA: ABNORMAL /HPF
RENAL EPITHELIAL, UA: ABNORMAL /HPF
SPECIFIC GRAVITY UA: 1.01 (ref 1–1.03)
T. PALLIDUM, IGG: NONREACTIVE
TEST INFORMATION: NORMAL
TRICHOMONAS: ABNORMAL
TRICYCLIC ANTIDEPRESSANTS, UR: NORMAL
TURBIDITY: ABNORMAL
URINE HGB: NEGATIVE
UROBILINOGEN, URINE: NORMAL
WBC # BLD: 9.1 K/UL (ref 3.5–11)
WBC UA: ABNORMAL /HPF
YEAST: ABNORMAL

## 2018-11-16 PROCEDURE — 94762 N-INVAS EAR/PLS OXIMTRY CONT: CPT

## 2018-11-16 PROCEDURE — 3700000001 HC ADD 15 MINUTES (ANESTHESIA): Performed by: OBSTETRICS & GYNECOLOGY

## 2018-11-16 PROCEDURE — 2709999900 HC NON-CHARGEABLE SUPPLY: Performed by: OBSTETRICS & GYNECOLOGY

## 2018-11-16 PROCEDURE — 0502F SUBSEQUENT PRENATAL CARE: CPT | Performed by: OBSTETRICS & GYNECOLOGY

## 2018-11-16 PROCEDURE — 88307 TISSUE EXAM BY PATHOLOGIST: CPT

## 2018-11-16 PROCEDURE — 4A1HXCZ MONITORING OF PRODUCTS OF CONCEPTION, CARDIAC RATE, EXTERNAL APPROACH: ICD-10-PCS | Performed by: OBSTETRICS & GYNECOLOGY

## 2018-11-16 PROCEDURE — 1220000000 HC SEMI PRIVATE OB R&B

## 2018-11-16 PROCEDURE — 87086 URINE CULTURE/COLONY COUNT: CPT

## 2018-11-16 PROCEDURE — 3700000000 HC ANESTHESIA ATTENDED CARE: Performed by: OBSTETRICS & GYNECOLOGY

## 2018-11-16 PROCEDURE — 6370000000 HC RX 637 (ALT 250 FOR IP): Performed by: STUDENT IN AN ORGANIZED HEALTH CARE EDUCATION/TRAINING PROGRAM

## 2018-11-16 PROCEDURE — 80307 DRUG TEST PRSMV CHEM ANLYZR: CPT

## 2018-11-16 PROCEDURE — 7100000000 HC PACU RECOVERY - FIRST 15 MIN: Performed by: OBSTETRICS & GYNECOLOGY

## 2018-11-16 PROCEDURE — 2500000003 HC RX 250 WO HCPCS: Performed by: ANESTHESIOLOGY

## 2018-11-16 PROCEDURE — 3609079900 HC CESAREAN SECTION: Performed by: OBSTETRICS & GYNECOLOGY

## 2018-11-16 PROCEDURE — 59510 CESAREAN DELIVERY: CPT | Performed by: OBSTETRICS & GYNECOLOGY

## 2018-11-16 PROCEDURE — 6360000002 HC RX W HCPCS: Performed by: STUDENT IN AN ORGANIZED HEALTH CARE EDUCATION/TRAINING PROGRAM

## 2018-11-16 PROCEDURE — 2500000003 HC RX 250 WO HCPCS: Performed by: STUDENT IN AN ORGANIZED HEALTH CARE EDUCATION/TRAINING PROGRAM

## 2018-11-16 PROCEDURE — 86900 BLOOD TYPING SEROLOGIC ABO: CPT

## 2018-11-16 PROCEDURE — 81001 URINALYSIS AUTO W/SCOPE: CPT

## 2018-11-16 PROCEDURE — 85027 COMPLETE CBC AUTOMATED: CPT

## 2018-11-16 PROCEDURE — 7100000001 HC PACU RECOVERY - ADDTL 15 MIN: Performed by: OBSTETRICS & GYNECOLOGY

## 2018-11-16 PROCEDURE — 36415 COLL VENOUS BLD VENIPUNCTURE: CPT

## 2018-11-16 PROCEDURE — 6360000002 HC RX W HCPCS: Performed by: ANESTHESIOLOGY

## 2018-11-16 PROCEDURE — 86780 TREPONEMA PALLIDUM: CPT

## 2018-11-16 PROCEDURE — 76815 OB US LIMITED FETUS(S): CPT

## 2018-11-16 PROCEDURE — 86850 RBC ANTIBODY SCREEN: CPT

## 2018-11-16 PROCEDURE — 86901 BLOOD TYPING SEROLOGIC RH(D): CPT

## 2018-11-16 PROCEDURE — 2580000003 HC RX 258: Performed by: STUDENT IN AN ORGANIZED HEALTH CARE EDUCATION/TRAINING PROGRAM

## 2018-11-16 PROCEDURE — 94664 DEMO&/EVAL PT USE INHALER: CPT

## 2018-11-16 RX ORDER — SODIUM CHLORIDE 0.9 % (FLUSH) 0.9 %
10 SYRINGE (ML) INJECTION EVERY 12 HOURS SCHEDULED
Status: DISCONTINUED | OUTPATIENT
Start: 2018-11-16 | End: 2018-11-17 | Stop reason: HOSPADM

## 2018-11-16 RX ORDER — NAPROXEN 500 MG/1
500 TABLET ORAL EVERY 12 HOURS
Status: DISCONTINUED | OUTPATIENT
Start: 2018-11-17 | End: 2018-11-17 | Stop reason: HOSPADM

## 2018-11-16 RX ORDER — SODIUM CHLORIDE, SODIUM LACTATE, POTASSIUM CHLORIDE, CALCIUM CHLORIDE 600; 310; 30; 20 MG/100ML; MG/100ML; MG/100ML; MG/100ML
INJECTION, SOLUTION INTRAVENOUS CONTINUOUS
Status: DISCONTINUED | OUTPATIENT
Start: 2018-11-16 | End: 2018-11-16

## 2018-11-16 RX ORDER — TRISODIUM CITRATE DIHYDRATE AND CITRIC ACID MONOHYDRATE 500; 334 MG/5ML; MG/5ML
30 SOLUTION ORAL ONCE
Status: COMPLETED | OUTPATIENT
Start: 2018-11-16 | End: 2018-11-16

## 2018-11-16 RX ORDER — ACETAMINOPHEN 325 MG/1
325 TABLET ORAL EVERY 4 HOURS PRN
Status: DISCONTINUED | OUTPATIENT
Start: 2018-11-16 | End: 2018-11-16

## 2018-11-16 RX ORDER — ONDANSETRON 2 MG/ML
4 INJECTION INTRAMUSCULAR; INTRAVENOUS EVERY 6 HOURS PRN
Status: DISCONTINUED | OUTPATIENT
Start: 2018-11-16 | End: 2018-11-17 | Stop reason: HOSPADM

## 2018-11-16 RX ORDER — SIMETHICONE 80 MG
80 TABLET,CHEWABLE ORAL EVERY 6 HOURS PRN
Status: DISCONTINUED | OUTPATIENT
Start: 2018-11-16 | End: 2018-11-17 | Stop reason: HOSPADM

## 2018-11-16 RX ORDER — KETOROLAC TROMETHAMINE 30 MG/ML
30 INJECTION, SOLUTION INTRAMUSCULAR; INTRAVENOUS EVERY 6 HOURS
Status: DISCONTINUED | OUTPATIENT
Start: 2018-11-16 | End: 2018-11-17 | Stop reason: HOSPADM

## 2018-11-16 RX ORDER — NALBUPHINE HCL 10 MG/ML
5 AMPUL (ML) INJECTION
Status: DISCONTINUED | OUTPATIENT
Start: 2018-11-16 | End: 2018-11-17 | Stop reason: HOSPADM

## 2018-11-16 RX ORDER — SODIUM CHLORIDE 0.9 % (FLUSH) 0.9 %
10 SYRINGE (ML) INJECTION PRN
Status: DISCONTINUED | OUTPATIENT
Start: 2018-11-16 | End: 2018-11-16

## 2018-11-16 RX ORDER — SODIUM CHLORIDE, SODIUM LACTATE, POTASSIUM CHLORIDE, CALCIUM CHLORIDE 600; 310; 30; 20 MG/100ML; MG/100ML; MG/100ML; MG/100ML
INJECTION, SOLUTION INTRAVENOUS CONTINUOUS
Status: DISCONTINUED | OUTPATIENT
Start: 2018-11-16 | End: 2018-11-17 | Stop reason: HOSPADM

## 2018-11-16 RX ORDER — NALOXONE HYDROCHLORIDE 0.4 MG/ML
0.4 INJECTION, SOLUTION INTRAMUSCULAR; INTRAVENOUS; SUBCUTANEOUS PRN
Status: DISCONTINUED | OUTPATIENT
Start: 2018-11-16 | End: 2018-11-17 | Stop reason: HOSPADM

## 2018-11-16 RX ORDER — DEXAMETHASONE SODIUM PHOSPHATE 4 MG/ML
INJECTION, SOLUTION INTRA-ARTICULAR; INTRALESIONAL; INTRAMUSCULAR; INTRAVENOUS; SOFT TISSUE PRN
Status: DISCONTINUED | OUTPATIENT
Start: 2018-11-16 | End: 2018-11-16 | Stop reason: SDUPTHER

## 2018-11-16 RX ORDER — SENNA PLUS 8.6 MG/1
1 TABLET ORAL NIGHTLY
Status: DISCONTINUED | OUTPATIENT
Start: 2018-11-17 | End: 2018-11-17 | Stop reason: HOSPADM

## 2018-11-16 RX ORDER — OXYCODONE HYDROCHLORIDE AND ACETAMINOPHEN 5; 325 MG/1; MG/1
2 TABLET ORAL EVERY 4 HOURS PRN
Status: DISCONTINUED | OUTPATIENT
Start: 2018-11-17 | End: 2018-11-17 | Stop reason: HOSPADM

## 2018-11-16 RX ORDER — DIPHENHYDRAMINE HYDROCHLORIDE 50 MG/ML
25 INJECTION INTRAMUSCULAR; INTRAVENOUS EVERY 6 HOURS PRN
Status: DISCONTINUED | OUTPATIENT
Start: 2018-11-16 | End: 2018-11-17 | Stop reason: HOSPADM

## 2018-11-16 RX ORDER — ONDANSETRON 2 MG/ML
INJECTION INTRAMUSCULAR; INTRAVENOUS PRN
Status: DISCONTINUED | OUTPATIENT
Start: 2018-11-16 | End: 2018-11-16 | Stop reason: SDUPTHER

## 2018-11-16 RX ORDER — SODIUM CHLORIDE 0.9 % (FLUSH) 0.9 %
10 SYRINGE (ML) INJECTION PRN
Status: DISCONTINUED | OUTPATIENT
Start: 2018-11-16 | End: 2018-11-17 | Stop reason: HOSPADM

## 2018-11-16 RX ORDER — BUPIVACAINE HYDROCHLORIDE 7.5 MG/ML
INJECTION, SOLUTION INTRASPINAL PRN
Status: DISCONTINUED | OUTPATIENT
Start: 2018-11-16 | End: 2018-11-16 | Stop reason: SDUPTHER

## 2018-11-16 RX ORDER — LANOLIN 100 %
OINTMENT (GRAM) TOPICAL
Status: DISCONTINUED | OUTPATIENT
Start: 2018-11-16 | End: 2018-11-17 | Stop reason: HOSPADM

## 2018-11-16 RX ORDER — OXYCODONE HYDROCHLORIDE AND ACETAMINOPHEN 5; 325 MG/1; MG/1
1 TABLET ORAL EVERY 4 HOURS PRN
Status: DISCONTINUED | OUTPATIENT
Start: 2018-11-17 | End: 2018-11-17 | Stop reason: HOSPADM

## 2018-11-16 RX ORDER — SODIUM CHLORIDE 0.9 % (FLUSH) 0.9 %
10 SYRINGE (ML) INJECTION EVERY 12 HOURS SCHEDULED
Status: DISCONTINUED | OUTPATIENT
Start: 2018-11-16 | End: 2018-11-16

## 2018-11-16 RX ORDER — BUPIVACAINE HYDROCHLORIDE 5 MG/ML
30 INJECTION, SOLUTION EPIDURAL; INTRACAUDAL ONCE
Status: DISCONTINUED | OUTPATIENT
Start: 2018-11-16 | End: 2018-11-16

## 2018-11-16 RX ADMIN — DEXTROSE MONOHYDRATE 1 G: 5 INJECTION INTRAVENOUS at 23:12

## 2018-11-16 RX ADMIN — KETOROLAC TROMETHAMINE 30 MG: 30 INJECTION, SOLUTION INTRAMUSCULAR; INTRAVENOUS at 20:18

## 2018-11-16 RX ADMIN — PHENYLEPHRINE HYDROCHLORIDE 100 MCG: 10 INJECTION INTRAVENOUS at 17:01

## 2018-11-16 RX ADMIN — BUPIVACAINE HYDROCHLORIDE IN DEXTROSE 1.5 ML: 7.5 INJECTION, SOLUTION SUBARACHNOID at 16:56

## 2018-11-16 RX ADMIN — SODIUM CITRATE AND CITRIC ACID MONOHYDRATE 30 ML: 500; 334 SOLUTION ORAL at 16:00

## 2018-11-16 RX ADMIN — SODIUM CHLORIDE, POTASSIUM CHLORIDE, SODIUM LACTATE AND CALCIUM CHLORIDE: 600; 310; 30; 20 INJECTION, SOLUTION INTRAVENOUS at 20:27

## 2018-11-16 RX ADMIN — Medication 2 G: at 16:07

## 2018-11-16 RX ADMIN — Medication 500 ML/HR: at 17:18

## 2018-11-16 RX ADMIN — DIPHENHYDRAMINE HYDROCHLORIDE 25 MG: 50 INJECTION, SOLUTION INTRAMUSCULAR; INTRAVENOUS at 20:20

## 2018-11-16 RX ADMIN — PHENYLEPHRINE HYDROCHLORIDE 100 MCG: 10 INJECTION INTRAVENOUS at 18:02

## 2018-11-16 RX ADMIN — PHENYLEPHRINE HYDROCHLORIDE 100 MCG: 10 INJECTION INTRAVENOUS at 17:04

## 2018-11-16 RX ADMIN — ONDANSETRON 4 MG: 2 INJECTION INTRAMUSCULAR; INTRAVENOUS at 17:20

## 2018-11-16 RX ADMIN — PHENYLEPHRINE HYDROCHLORIDE 100 MCG: 10 INJECTION INTRAVENOUS at 17:08

## 2018-11-16 RX ADMIN — DEXAMETHASONE SODIUM PHOSPHATE 4 MG: 4 INJECTION, SOLUTION INTRAMUSCULAR; INTRAVENOUS at 17:20

## 2018-11-16 RX ADMIN — SODIUM CHLORIDE, POTASSIUM CHLORIDE, SODIUM LACTATE AND CALCIUM CHLORIDE: 600; 310; 30; 20 INJECTION, SOLUTION INTRAVENOUS at 23:31

## 2018-11-16 RX ADMIN — PHENYLEPHRINE HYDROCHLORIDE 100 MCG: 10 INJECTION INTRAVENOUS at 16:57

## 2018-11-16 RX ADMIN — SODIUM CHLORIDE, POTASSIUM CHLORIDE, SODIUM LACTATE AND CALCIUM CHLORIDE: 600; 310; 30; 20 INJECTION, SOLUTION INTRAVENOUS at 17:06

## 2018-11-16 RX ADMIN — BUPIVACAINE HYDROCHLORIDE 150 MG: 5 INJECTION, SOLUTION EPIDURAL; INTRACAUDAL at 17:30

## 2018-11-16 RX ADMIN — Medication 1 MILLI-UNITS/MIN: at 17:18

## 2018-11-16 RX ADMIN — NALBUPHINE HYDROCHLORIDE 5 MG: 10 INJECTION, SOLUTION INTRAMUSCULAR; INTRAVENOUS; SUBCUTANEOUS at 22:13

## 2018-11-16 ASSESSMENT — PULMONARY FUNCTION TESTS
PIF_VALUE: 0
PIF_VALUE: 0
PIF_VALUE: 1
PIF_VALUE: 0
PIF_VALUE: 1
PIF_VALUE: 0
PIF_VALUE: 0
PIF_VALUE: 1
PIF_VALUE: 0
PIF_VALUE: 1
PIF_VALUE: 1
PIF_VALUE: 0
PIF_VALUE: 1
PIF_VALUE: 0
PIF_VALUE: 1
PIF_VALUE: 0
PIF_VALUE: 0
PIF_VALUE: 1
PIF_VALUE: 0
PIF_VALUE: 1
PIF_VALUE: 1
PIF_VALUE: 0
PIF_VALUE: 0
PIF_VALUE: 1
PIF_VALUE: 1
PIF_VALUE: 0
PIF_VALUE: 1
PIF_VALUE: 0
PIF_VALUE: 1
PIF_VALUE: 0
PIF_VALUE: 1
PIF_VALUE: 0
PIF_VALUE: 1
PIF_VALUE: 0
PIF_VALUE: 1
PIF_VALUE: 1
PIF_VALUE: 0
PIF_VALUE: 1
PIF_VALUE: 0
PIF_VALUE: 0

## 2018-11-16 ASSESSMENT — PAIN SCALES - GENERAL
PAINLEVEL_OUTOF10: 1
PAINLEVEL_OUTOF10: 0

## 2018-11-16 NOTE — PROGRESS NOTES
POSITIVE   Final    Antibody Screen 11/16/2018 NEGATIVE   Final    WBC 11/16/2018 9.1  3.5 - 11.0 k/uL Final    RBC 11/16/2018 3.67* 4.0 - 5.2 m/uL Final    Hemoglobin 11/16/2018 11.6* 12.0 - 16.0 g/dL Final    Hematocrit 11/16/2018 33.9* 36 - 46 % Final    MCV 11/16/2018 92.4  80 - 100 fL Final    MCH 11/16/2018 31.6  26 - 34 pg Final    MCHC 11/16/2018 34.2  31 - 37 g/dL Final    RDW 11/16/2018 16.2* 11.5 - 14.9 % Final    Platelets 97/64/0966 281  150 - 450 k/uL Final    MPV 11/16/2018 8.5  6.0 - 12.0 fL Final    NRBC Automated 11/16/2018 NOT REPORTED  per 100 WBC Final    Amphetamine Screen, Ur 11/16/2018 NEGATIVE  NEG Final    Comment:       (Positive cutoff 1000 ng/mL)                  Barbiturate Screen, Ur 11/16/2018 NEGATIVE  NEG Final    Comment:       (Positive cutoff 200 ng/mL)                  Benzodiazepine Screen, Urine 11/16/2018 NEGATIVE  NEG Final    Comment:       (Positive cutoff 200 ng/mL)                  Cocaine Metabolite, Urine 11/16/2018 NEGATIVE  NEG Final    Comment:       (Positive cutoff 300 ng/mL)                  Methadone Screen, Urine 11/16/2018 NEGATIVE  NEG Final    Comment:       (Positive cutoff 300 ng/mL)                  Opiates, Urine 11/16/2018 NEGATIVE  NEG Final    Comment:       (Positive cutoff 300 ng/mL)                  Phencyclidine, Urine 11/16/2018 NEGATIVE  NEG Final    Comment:       (Positive cutoff 25 ng/mL)                  Propoxyphene, Urine 11/16/2018 NOT REPORTED  NEG Final    Cannabinoid Scrn, Ur 11/16/2018 NEGATIVE  NEG Final    Comment:       (Positive cutoff 50 ng/mL)                  Oxycodone Screen, Ur 11/16/2018 NEGATIVE  NEG Final    Comment:       (Positive cutoff 100 ng/mL)                  Methamphetamine, Urine 11/16/2018 NOT REPORTED  NEG Final    Tricyclic Antidepressants, Urine 11/16/2018 NOT REPORTED  NEG Final    MDMA, Urine 11/16/2018 NOT REPORTED  NEG Final    Buprenorphine Urine 11/16/2018 NOT REPORTED  NEG Final    Test Information 11/16/2018 Assay provides medical screening only. The absence of expected drug(s) and/or   Final    Comment:  metabolite(s) may indicate diluted or adulterated urine, limitations of testing   or timing of collection. Testing for legal purposes should be confirmed by another method. To request   confirmation of test result, please call the lab within 7 days of sample   submission.  Color, UA 11/16/2018 YELLOW  YEL Final    Turbidity UA 11/16/2018 CLOUDY* CLEAR Final    Glucose, Ur 11/16/2018 NEGATIVE  NEG Final    Bilirubin Urine 11/16/2018 NEGATIVE  NEG Final    Ketones, Urine 11/16/2018 NEGATIVE  NEG Final    Specific Gravity, UA 11/16/2018 1.010  1.000 - 1.030 Final    Urine Hgb 11/16/2018 NEGATIVE  NEG Final    pH, UA 11/16/2018 7.0  5.0 - 8.0 Final    Protein, UA 11/16/2018 NEGATIVE  NEG Final    Urobilinogen, Urine 11/16/2018 Normal  NORM Final    Nitrite, Urine 11/16/2018 NEGATIVE  NEG Final    Leukocyte Esterase, Urine 11/16/2018 SMALL* NEG Final    Urinalysis Comments 11/16/2018 NOT REPORTED   Final    - 11/16/2018        Final    WBC, UA 11/16/2018 0 TO 2  /HPF Final    RBC, UA 11/16/2018 None  /HPF Final    Casts UA 11/16/2018 NOT REPORTED  /LPF Final    Crystals UA 11/16/2018 NOT REPORTED  NONE /HPF Final    Epithelial Cells UA 11/16/2018 2 TO 5  /HPF Final    Renal Epithelial, Urine 11/16/2018 NOT REPORTED  0 /HPF Final    Bacteria, UA 11/16/2018 MANY* NONE Final    Mucus, UA 11/16/2018 NOT REPORTED  NONE Final    Trichomonas, UA 11/16/2018 NOT REPORTED  NONE Final    Amorphous, UA 11/16/2018 NOT REPORTED  NONE Final    Other Observations UA 11/16/2018 NOT REPORTED  NREQ Final    Yeast, UA 11/16/2018 NOT REPORTED  NONE Final   Hospital Outpatient Visit on 11/07/2018   Component Date Value Ref Range Status    Specimen Description 11/07/2018 . VAGINAL SPECIMEN   Final    Special Requests 11/07/2018 NOT REPORTED   Final    Culture 11/07/2018

## 2018-11-16 NOTE — ANESTHESIA PRE PROCEDURE
Department of Anesthesiology  Preprocedure Note       Name:  Claudio Quesada   Age:  35 y.o.  :  1985                                          MRN:  [id number]         Date:  2018      Surgeon: Lin Soto):  Erwin Cbarera DO    Procedure:  SECTION (N/A )    Medications prior to admission:   Prior to Admission medications    Medication Sig Start Date End Date Taking? Authorizing Provider   ferrous sulfate 325 (65 Fe) MG tablet TAKE 1 TABLET BY MOUTH TWO TIMES A DAY WITH MEALS 18  Yes Historical Provider, MD   DiphenhydrAMINE HCl (BENADRYL ALLERGY CHILDRENS PO) Take by mouth   Yes Historical Provider, MD   Pediatric Multivit-Minerals-C (RA GUMMY VITAMINS & MINERALS) CHEW Take by mouth   Yes Historical Provider, MD       Current medications:    Current Facility-Administered Medications   Medication Dose Route Frequency Provider Last Rate Last Dose    lactated ringers infusion   Intravenous Continuous Cora Apitsch, DO        sodium chloride flush 0.9 % injection 10 mL  10 mL Intravenous 2 times per day Mike Dubin, DO        sodium chloride flush 0.9 % injection 10 mL  10 mL Intravenous PRN Mike Dubin, DO        oxytocin (PITOCIN) 30 units in 500 mL infusion  1 suri-units/min Intravenous Continuous Cora Apitsch, DO           Allergies:     Allergies   Allergen Reactions    Amoxicillin-Pot Clavulanate     Codeine Rash    Sulfa Antibiotics Rash       Problem List:    Patient Active Problem List   Diagnosis Code    R complex Adnexal cyst (2.74 x 1.41 x 1.52cm N94.9    Prior CS x 2 O34.219    S/P RLTCS 2/3/15 M  7#13 I02.730    Normal pregnancy in multigravida in second trimester Z34.82    Low implantation of placenta without hemorrhage O44.40    Hx of preeclampsia, prior pregnancy, currently pregnant, second trimester O09.292    Anemia  O99.013    Hx of preeclampsia (G2) O09.293    Encounter for supervision of normal pregnancy in multigravida in third trimester

## 2018-11-16 NOTE — DISCHARGE SUMMARY
Obstetric Discharge Summary  [facility]    Patient Name: Makenna Curtis  Patient : 1985  Primary Care Physician: Deandra Manzano MD  Admit Date: 2018    Principal Diagnosis: IUP at 38w1d, admitted for  Section (Repeat), 2/2 Oligo (LIBORIO 5.54cm)       Her pregnancy has been complicated by:   Patient Active Problem List   Diagnosis    R complex Adnexal cyst (2.74 x 1.41 x 1.52cm    Prior CS x 2    S/P RLTCS 2/3/15 M  7#13    Normal pregnancy in multigravida in second trimester    Low implantation of placenta without hemorrhage    Hx of preeclampsia, prior pregnancy, currently pregnant, second trimester    Anemia     Hx of preeclampsia (G2)    Encounter for supervision of normal pregnancy in multigravida in third trimester    LGA (large for gestational age) fetus affecting mother, antepartum    Suspected placental problem not found    Suspected LGA    Hyperthyroidism (no meds)    HRP (high risk pregnancy), third trimester    Oligohydramnios    RLTCS 18 M Apg? Wt 8#1       Infection Present?: No  Hospital Acquired: No    Surgical Operations & Procedures:  [] Pitocin Induction of Labor  [] Pitocin Augmentation of Labor  [] Prostaglandin Induction of Labor  [] Mechanical Induction of Labor  [] Artificial Rupture of Membranes  [] Intrauterine Pressure Catheter  [] Fetal Scalp Electrode  [] Amnioinfusion  Analgesia: spinal  Delivery Type:  Delivery: See Labor and Delivery Summary   Laceration(s): Absent    Consultations: NICU and Anesthesia    Pertinent Findings & Procedures:   Makenna Curtis is a 35 y.o. female  at 43w4d admitted for  Section (Repeat) 2/2 Oligo (LIBORIO 5.54cm), declining TOLAC; received ancef/bicitra preoperateively. She delivered by  without labor a Live Born infant on 18.        Information for the patient's :  Natasha Oliver [[id number]]   male  Birth Weight: 8 lb 1.5 oz (3.671

## 2018-11-16 NOTE — OP NOTE
Operative Note  Department of Obstetrics and Gynecology  [facility]     Patient: Nan Hager   : 1985  MRN: [id number]       Acct: [de-identified]   PCP: Jessica Quinn MD  Date of Procedure: 18    Pre-operative Diagnosis: 35 y.o. female  at 38w1d with repeat  section secondary to oligohydramnios   History of  section x2   Declined TOLAC   Suspected large for gestational age   Hyperthyroidism   Right complex adnexal cyst (2.74 s 1.41 x 1.52cm) on ultrasound  Anemia      Post-operative Diagnosis: Same as above, single live born male infant   Thin lower uterine segment   Thick subcutaneous scar tissue      Procedure: repeat low transverse  section    Indications: 35 y.o. female  at 38w1d with repeat  section secondary to oligohydramnios. Patient has a history of  section x2, declined TOLAC. Surgeon: Dr. Lor Spaulding  Assistants: Sabrina Kuo DO, PGY2    Anesthesia: spinal with duramorph    Procedure Details   The patient was seen pre-operatively. The risks, benefits, complications, treatment options, and expected outcomes were discussed with the patient. The patient concurred with the proposed plan, giving informed consent. The patient was taken to the Operating Room, identified as Nan Hager and the procedure verified as  Delivery. A Time Out was held and the above information confirmed. After administration of spinal anesthesia, the patient was draped and prepped in the usual sterile manner. A Pfannenstiel incision was made and carried down through the subcutaneous tissue to the fascia using scalpel and bovie electrocautery, the previous scar was excised. Fascial incision was made and extended transversely using bovie electrocautery for sharp dissection. The fascia was  from the underlying rectus tissue superiorly and inferiorly using bovie electrocautery.  A thick layer of subcutaneous scar tissue was noted. The peritoneum was identified and entered bluntly. Bovie electrocautery was used to take the peritoneum down sharply. Peritoneal incision was extended longitudinally with blunt stretch, bladder retractor was placed. A low transverse uterine incision was made using a new scalpel blade. Blunt stretch on the hysterotomy incision was made and the amniotomy was performed revealing clear fluid. Delivered from cephalic presentation was a Live Born male infant, nuchal cord was present x1 and reduced. The infant was suctioned, dried and the umbilical cord was clamped and cut after one minute delayed cord clamping. The infant was taken to the warmer and attended by NICU for evaluation. A second section of cord was clamped and cut and sent for gases. Cord blood was obtained for evaluation. The placenta was removed manually and appeared intact, whole and that the umbilical cord had three vessels noted. Pitocin was started. The uterus was cleaned of all clots and debris. A thin lower uterine segment was noted. The uterine incision was closed with running locked sutures of 0 Vicryl x2. Hemostasis was observed. Bilateral abdominal gutters were cleared of all clots and debris. Bilateral tubes and ovaries were visualized and appeared normal. The hysterotomy was again inspected and found to be hemostatic. Peritoneum was reapproximated with running suture of 2-0 Vicryl. Rectus muscles were inspected and found to be hemostatic. The fascia was then reapproximated with running sutures of 0 Vicryl x2. The subcuticular space was irrigated copiously. The subcuticular space was closed using  0 Vicryl interrupted sutures. The skin was reapproximated with a 4-0 Vicryl. The skin was then cleansed and dressed with a bandage in sterile fashion. Instrument, sponge, and needle counts were correct prior the abdominal closure and at the conclusion of the case. The urine remained clear throughout the case.   Ancef was given for

## 2018-11-17 ENCOUNTER — HOSPITAL ENCOUNTER (INPATIENT)
Age: 33
LOS: 3 days | Discharge: HOME | DRG: 776 | End: 2018-11-20
Attending: OBSTETRICS & GYNECOLOGY | Admitting: OBSTETRICS & GYNECOLOGY
Payer: COMMERCIAL

## 2018-11-17 VITALS
OXYGEN SATURATION: 97 % | SYSTOLIC BLOOD PRESSURE: 118 MMHG | HEART RATE: 81 BPM | DIASTOLIC BLOOD PRESSURE: 71 MMHG | RESPIRATION RATE: 16 BRPM | TEMPERATURE: 98.6 F

## 2018-11-17 LAB
CULTURE: NO GROWTH
HCT VFR BLD CALC: 29.2 % (ref 36.3–47.1)
HEMOGLOBIN: 9.5 G/DL (ref 11.9–15.1)
Lab: NORMAL
SPECIMEN DESCRIPTION: NORMAL
STATUS: NORMAL

## 2018-11-17 PROCEDURE — 1220000000 HC SEMI PRIVATE OB R&B

## 2018-11-17 PROCEDURE — 6360000002 HC RX W HCPCS: Performed by: STUDENT IN AN ORGANIZED HEALTH CARE EDUCATION/TRAINING PROGRAM

## 2018-11-17 PROCEDURE — 36415 COLL VENOUS BLD VENIPUNCTURE: CPT

## 2018-11-17 PROCEDURE — 6370000000 HC RX 637 (ALT 250 FOR IP): Performed by: STUDENT IN AN ORGANIZED HEALTH CARE EDUCATION/TRAINING PROGRAM

## 2018-11-17 PROCEDURE — 85014 HEMATOCRIT: CPT

## 2018-11-17 PROCEDURE — 2580000003 HC RX 258: Performed by: STUDENT IN AN ORGANIZED HEALTH CARE EDUCATION/TRAINING PROGRAM

## 2018-11-17 PROCEDURE — 6360000002 HC RX W HCPCS: Performed by: ANESTHESIOLOGY

## 2018-11-17 PROCEDURE — 85018 HEMOGLOBIN: CPT

## 2018-11-17 PROCEDURE — 6370000000 HC RX 637 (ALT 250 FOR IP): Performed by: OBSTETRICS & GYNECOLOGY

## 2018-11-17 RX ORDER — LANOLIN 100 %
OINTMENT (GRAM) TOPICAL
Status: DISCONTINUED | OUTPATIENT
Start: 2018-11-17 | End: 2018-11-20 | Stop reason: HOSPADM

## 2018-11-17 RX ORDER — ONDANSETRON 2 MG/ML
4 INJECTION INTRAMUSCULAR; INTRAVENOUS EVERY 6 HOURS PRN
Status: DISCONTINUED | OUTPATIENT
Start: 2018-11-17 | End: 2018-11-17 | Stop reason: SDUPTHER

## 2018-11-17 RX ORDER — DIPHENHYDRAMINE HYDROCHLORIDE 50 MG/ML
25 INJECTION INTRAMUSCULAR; INTRAVENOUS EVERY 6 HOURS PRN
Status: DISCONTINUED | OUTPATIENT
Start: 2018-11-17 | End: 2018-11-20 | Stop reason: HOSPADM

## 2018-11-17 RX ORDER — HYDROXYZINE HYDROCHLORIDE 25 MG/1
25 TABLET, FILM COATED ORAL 3 TIMES DAILY PRN
Status: DISCONTINUED | OUTPATIENT
Start: 2018-11-17 | End: 2018-11-20 | Stop reason: HOSPADM

## 2018-11-17 RX ORDER — CEFAZOLIN SODIUM 1 G/50ML
1 INJECTION, SOLUTION INTRAVENOUS EVERY 8 HOURS
Status: COMPLETED | OUTPATIENT
Start: 2018-11-17 | End: 2018-11-17

## 2018-11-17 RX ORDER — OXYCODONE HYDROCHLORIDE AND ACETAMINOPHEN 5; 325 MG/1; MG/1
1 TABLET ORAL EVERY 4 HOURS PRN
Status: DISCONTINUED | OUTPATIENT
Start: 2018-11-17 | End: 2018-11-20 | Stop reason: HOSPADM

## 2018-11-17 RX ORDER — SODIUM CHLORIDE 0.9 % (FLUSH) 0.9 %
10 SYRINGE (ML) INJECTION PRN
Status: DISCONTINUED | OUTPATIENT
Start: 2018-11-17 | End: 2018-11-20 | Stop reason: HOSPADM

## 2018-11-17 RX ORDER — SODIUM CHLORIDE 0.9 % (FLUSH) 0.9 %
10 SYRINGE (ML) INJECTION EVERY 12 HOURS SCHEDULED
Status: DISCONTINUED | OUTPATIENT
Start: 2018-11-17 | End: 2018-11-20 | Stop reason: HOSPADM

## 2018-11-17 RX ORDER — SENNA PLUS 8.6 MG/1
1 TABLET ORAL NIGHTLY
Status: DISCONTINUED | OUTPATIENT
Start: 2018-11-17 | End: 2018-11-20 | Stop reason: HOSPADM

## 2018-11-17 RX ORDER — NALOXONE HYDROCHLORIDE 0.4 MG/ML
0.4 INJECTION, SOLUTION INTRAMUSCULAR; INTRAVENOUS; SUBCUTANEOUS PRN
Status: DISCONTINUED | OUTPATIENT
Start: 2018-11-17 | End: 2018-11-20 | Stop reason: HOSPADM

## 2018-11-17 RX ORDER — NAPROXEN 250 MG/1
500 TABLET ORAL EVERY 12 HOURS
Status: DISCONTINUED | OUTPATIENT
Start: 2018-11-17 | End: 2018-11-20 | Stop reason: HOSPADM

## 2018-11-17 RX ORDER — LANOLIN ALCOHOL/MO/W.PET/CERES
325 CREAM (GRAM) TOPICAL 2 TIMES DAILY WITH MEALS
Status: DISCONTINUED | OUTPATIENT
Start: 2018-11-17 | End: 2018-11-20 | Stop reason: HOSPADM

## 2018-11-17 RX ORDER — ONDANSETRON 2 MG/ML
4 INJECTION INTRAMUSCULAR; INTRAVENOUS EVERY 6 HOURS PRN
Status: DISCONTINUED | OUTPATIENT
Start: 2018-11-17 | End: 2018-11-20 | Stop reason: HOSPADM

## 2018-11-17 RX ORDER — SIMETHICONE 80 MG
80 TABLET,CHEWABLE ORAL EVERY 6 HOURS PRN
Status: DISCONTINUED | OUTPATIENT
Start: 2018-11-17 | End: 2018-11-20 | Stop reason: HOSPADM

## 2018-11-17 RX ORDER — OXYCODONE HYDROCHLORIDE AND ACETAMINOPHEN 5; 325 MG/1; MG/1
2 TABLET ORAL EVERY 4 HOURS PRN
Status: DISCONTINUED | OUTPATIENT
Start: 2018-11-17 | End: 2018-11-20 | Stop reason: HOSPADM

## 2018-11-17 RX ORDER — KETOROLAC TROMETHAMINE 30 MG/ML
30 INJECTION, SOLUTION INTRAMUSCULAR; INTRAVENOUS EVERY 6 HOURS
Status: DISPENSED | OUTPATIENT
Start: 2018-11-17 | End: 2018-11-18

## 2018-11-17 RX ORDER — SODIUM CHLORIDE, SODIUM LACTATE, POTASSIUM CHLORIDE, CALCIUM CHLORIDE 600; 310; 30; 20 MG/100ML; MG/100ML; MG/100ML; MG/100ML
INJECTION, SOLUTION INTRAVENOUS CONTINUOUS
Status: DISCONTINUED | OUTPATIENT
Start: 2018-11-17 | End: 2018-11-18

## 2018-11-17 RX ADMIN — OXYCODONE HYDROCHLORIDE AND ACETAMINOPHEN 1 TABLET: 5; 325 TABLET ORAL at 13:46

## 2018-11-17 RX ADMIN — Medication 1 MILLI-UNITS/MIN: at 02:45

## 2018-11-17 RX ADMIN — HYDROXYZINE HYDROCHLORIDE 25 MG: 25 TABLET ORAL at 17:47

## 2018-11-17 RX ADMIN — KETOROLAC TROMETHAMINE 30 MG: 30 INJECTION, SOLUTION INTRAMUSCULAR at 02:41

## 2018-11-17 RX ADMIN — SENNOSIDES 8.6 MG: 8.6 TABLET, FILM COATED ORAL at 22:30

## 2018-11-17 RX ADMIN — CEFAZOLIN SODIUM 1 G: 1 INJECTION, SOLUTION INTRAVENOUS at 07:00

## 2018-11-17 RX ADMIN — NALBUPHINE HYDROCHLORIDE 5 MG: 10 INJECTION, SOLUTION INTRAMUSCULAR; INTRAVENOUS; SUBCUTANEOUS at 01:22

## 2018-11-17 RX ADMIN — FERROUS SULFATE TAB EC 325 MG (65 MG FE EQUIVALENT) 325 MG: 325 (65 FE) TABLET DELAYED RESPONSE at 09:21

## 2018-11-17 RX ADMIN — HYDROXYZINE HYDROCHLORIDE 25 MG: 25 TABLET ORAL at 09:21

## 2018-11-17 RX ADMIN — NAPROXEN 500 MG: 250 TABLET ORAL at 13:46

## 2018-11-17 RX ADMIN — MAGNESIUM HYDROXIDE 30 ML: 400 SUSPENSION ORAL at 08:28

## 2018-11-17 RX ADMIN — DIPHENHYDRAMINE HYDROCHLORIDE 25 MG: 50 INJECTION, SOLUTION INTRAMUSCULAR; INTRAVENOUS at 02:43

## 2018-11-17 RX ADMIN — OXYCODONE HYDROCHLORIDE AND ACETAMINOPHEN 2 TABLET: 5; 325 TABLET ORAL at 17:47

## 2018-11-17 RX ADMIN — OXYCODONE HYDROCHLORIDE AND ACETAMINOPHEN 2 TABLET: 5; 325 TABLET ORAL at 22:29

## 2018-11-17 RX ADMIN — FERROUS SULFATE TAB EC 325 MG (65 MG FE EQUIVALENT) 325 MG: 325 (65 FE) TABLET DELAYED RESPONSE at 16:35

## 2018-11-17 RX ADMIN — KETOROLAC TROMETHAMINE 30 MG: 30 INJECTION, SOLUTION INTRAMUSCULAR at 08:28

## 2018-11-17 RX ADMIN — SODIUM CHLORIDE, POTASSIUM CHLORIDE, SODIUM LACTATE AND CALCIUM CHLORIDE: 600; 310; 30; 20 INJECTION, SOLUTION INTRAVENOUS at 02:45

## 2018-11-17 RX ADMIN — Medication 10 ML: at 22:32

## 2018-11-17 ASSESSMENT — PAIN SCALES - GENERAL
PAINLEVEL_OUTOF10: 3
PAINLEVEL_OUTOF10: 9
PAINLEVEL_OUTOF10: 6
PAINLEVEL_OUTOF10: 3
PAINLEVEL_OUTOF10: 5
PAINLEVEL_OUTOF10: 5

## 2018-11-17 NOTE — FLOWSHEET NOTE
Report given to Linda Triana RN at [facility].   To be transferred to room 734 bed 1 on [facility]

## 2018-11-17 NOTE — PROGRESS NOTES
OBGYN Resident Interval Note    Patient is very anxious about her thyroid levels. Requesting blood work be done to evaluate her thyroid levels due to becoming hypothyroid after her last pregnancy.  Patient is asymptomatic and her post partum course has been normal.     Will draw TSH reflex T4 11/18 am    Vitals:    11/17/18 0200 11/17/18 0800 11/17/18 1315   BP: 124/86 124/77 112/76   Pulse: 93 89 92   Resp: 18 18 16   Temp: 98.6 °F (37 °C) 99.1 °F (37.3 °C) 97.9 °F (36.6 °C)   TempSrc: Oral Oral Oral   SpO2: 98%         Gale Barakat DO   OB/GYN Resident  Pager [phone] [facility]  11/17/2018, 5:06 PM

## 2018-11-17 NOTE — H&P
OBSTETRICAL HISTORY Mele Simms    Date: 2018       Time: 2:24 AM   Patient Name: Justin Pearl     Patient : 1985  Room/Bed: 2456/9290-24    Admission Date/Time: 2018  2:03 AM      CC: Transfer [facility] due to Infant transported to NICU; Maternal bonding     HPI: Justin Pearl is a 35 y.o. [id number] who presents as a transfer from [facility] [place] [facility] due to Infant transported to NICU; Maternal bonding. [facility]. Today she is doing well without any chief complaint. Her lochia is light. She denies chest pain, shortness of breath, headache, lightheadedness, blurred vision and peripheral edema. She is  breastpumping and she denies any signs or symptoms of mastitis. She is ambulating well. [place] in place draining clear yellow urine. She currently denies S/S of postpartum depression. Flatus absent. Bowel movement absent. She is tolerating solids. PREGNANCY RISK FACTORS:  Patient Active Problem List   Diagnosis    R complex Adnexal cyst (2.74 x 1.41 x 1.52cm    Prior CS x 2    S/P RLTCS 2/3/15 M  7#13    Normal pregnancy in multigravida in second trimester    Low implantation of placenta without hemorrhage    Hx of preeclampsia, prior pregnancy, currently pregnant, second trimester    Anemia     Hx of preeclampsia (G2)    Encounter for supervision of normal pregnancy in multigravida in third trimester    LGA (large for gestational age) fetus affecting mother, antepartum    Suspected placental problem not found    Suspected LGA    Hyperthyroidism (no meds)    HRP (high risk pregnancy), third trimester    Oligohydramnios    RLS 18 M Apg?  Wt 8#1    At risk for impaired parent-infant bonding         REVIEW OF SYSTEMS:  Constitutional: negative fever, negative chills  HEENT: negative visual disturbances, negative headaches  Respiratory: negative dyspnea, negative cough  Cardiovascular: negative chest pain,  negative palpitations  Gastrointestinal: negative abdominal pain, negative RUQ pain, negative N/V, negative diarrhea, negative constipation  Genitourinary: negative dysuria, negative vaginal discharge  Dermatological: negative rash  Hematologic: negative bruising  Immunologic/Lymphatic: negative recent illness, negative recent sick contact  Musculoskeletal: negative back pain  Neurological:  negative dizziness, negative weakness  Behavior/Psych: negative depression, negative anxiety    OBSTETRICAL HISTORY:   Obstetric History       T3      L3     SAB0   TAB0   Ectopic0   Molar0   Multiple0   Live Births3       # Outcome Date GA Lbr Usman/2nd Weight Sex Delivery Anes PTL Lv   3 Term 18 38w1d  8 lb 1.5 oz (3.671 kg) M CS-LTranv   MARLEN      Name: Jamie Nguyen   2 Term 02/03/15 38w0d  7 lb 12.5 oz (3.53 kg) M CS-LTranv Spinal  MARLEN      Apgar1:  9                Apgar5: 9   1 Term 2014 39w0d  8 lb (3.629 kg) F CS-LTranv  N MARLEN          PAST MEDICAL HISTORY:   has a past medical history of Hyperthyroidism and Seasonal allergies. PAST SURGICAL HISTORY:   has a past surgical history that includes  section (2014) and  section, low transverse (12/5/15). ALLERGIES:  is allergic to amoxicillin-pot clavulanate; codeine; and sulfa antibiotics. MEDICATIONS:  Prior to Admission medications    Medication Sig Start Date End Date Taking?  Authorizing Provider   ferrous sulfate 325 (65 Fe) MG tablet TAKE 1 TABLET BY MOUTH TWO TIMES A DAY WITH MEALS 18   Historical Provider, MD   DiphenhydrAMINE HCl (BENADRYL ALLERGY CHILDRENS PO) Take by mouth    Historical Provider, MD   Pediatric Multivit-Minerals-C (RA GUMMY VITAMINS & MINERALS) CHEW Take by mouth    Historical Provider, MD       FAMILY HISTORY:  family history includes Breast Cancer in her mother; Diabetes in her maternal grandmother; Heart Failure in her maternal grandfather and paternal grandfather; Hypertension in her father; Other in her maternal grandmother. SOCIAL HISTORY:   reports that she has never smoked. She has never used smokeless tobacco. She reports that she does not drink alcohol or use drugs. VITALS:  Vitals:    18 0200   BP: 124/86   Pulse: 93   Resp: 18   Temp: 98.6 °F (37 °C)   TempSrc: Oral   SpO2: 98%         PE:   General:  no apparent distress, alert and cooperative  Neurologic:  alert, oriented, normal speech, no focal findings or movement disorder noted  Lungs:  No increased work of breathing, good air exchange, clear to auscultation bilaterally, no crackles or wheezing  Heart:  regular rate and rhythm    Abdomen: abdomen soft, non-distended, non-tender  Fundus: non-tender, normal size, firm, below umbilicus  Incision: Bandage over incision, clean and dry  Extremities:  no calf tenderness, non edematous    UOP: 150ml/hour for last 2 hours      ASSESSMENT & PLAN:  Joe Jimenez is a 35 y.o. female  at 38w1d presents as a transfer from [facility] due to Infant transported to NICU; Maternal bonding    1. Joe Jimenez is a  POD # 0 s/p RLTCS   - Doing well, VSS    - male infant in NICU, circumcision desired however concern for hypospadias; will hold at this time   - Encourage ambulation and use of incentive spirometer   - D/C cardoso catheter and saline lock IV on POD #1    - H/H pending  2. Rh positive/Rubella immune  3. Breast pumping   4. Hyperthyroidism (no meds)  5. Anemia  - H/H pending this AM  6. H/O postpartum preeclampsia  - BP stable  - Denies s/s of preeclampsia  7. Continue post-op care. Patient Active Problem List    Diagnosis Date Noted    S/P RLTCS 2/3/15 M / 7#13 2015     Priority: High    Suspected LGA 2018    Hyperthyroidism (no meds) 2018    HRP (high risk pregnancy), third trimester 2018    Oligohydramnios 2018    RLTCS 18 M Apg?  Wt 8#1 2018    At risk for impaired

## 2018-11-18 LAB
THYROXINE, FREE: 1.24 NG/DL (ref 0.93–1.7)
TSH SERPL DL<=0.05 MIU/L-ACNC: 5.95 MIU/L (ref 0.3–5)

## 2018-11-18 PROCEDURE — 6370000000 HC RX 637 (ALT 250 FOR IP): Performed by: OBSTETRICS & GYNECOLOGY

## 2018-11-18 PROCEDURE — 1220000000 HC SEMI PRIVATE OB R&B

## 2018-11-18 PROCEDURE — 36415 COLL VENOUS BLD VENIPUNCTURE: CPT

## 2018-11-18 PROCEDURE — 6370000000 HC RX 637 (ALT 250 FOR IP): Performed by: STUDENT IN AN ORGANIZED HEALTH CARE EDUCATION/TRAINING PROGRAM

## 2018-11-18 PROCEDURE — 84443 ASSAY THYROID STIM HORMONE: CPT

## 2018-11-18 PROCEDURE — 84439 ASSAY OF FREE THYROXINE: CPT

## 2018-11-18 RX ORDER — OXYCODONE HYDROCHLORIDE AND ACETAMINOPHEN 5; 325 MG/1; MG/1
1 TABLET ORAL EVERY 4 HOURS PRN
Qty: 28 TABLET | Refills: 0 | Status: SHIPPED | OUTPATIENT
Start: 2018-11-18 | End: 2018-11-25

## 2018-11-18 RX ORDER — LANOLIN ALCOHOL/MO/W.PET/CERES
325 CREAM (GRAM) TOPICAL 2 TIMES DAILY WITH MEALS
Qty: 90 TABLET | Refills: 3 | Status: SHIPPED | OUTPATIENT
Start: 2018-11-18 | End: 2021-07-06

## 2018-11-18 RX ORDER — SENNA PLUS 8.6 MG/1
1 TABLET ORAL NIGHTLY
Qty: 30 TABLET | Refills: 0 | Status: SHIPPED | OUTPATIENT
Start: 2018-11-18 | End: 2018-12-18

## 2018-11-18 RX ADMIN — NAPROXEN 500 MG: 250 TABLET ORAL at 01:25

## 2018-11-18 RX ADMIN — HYDROXYZINE HYDROCHLORIDE 25 MG: 25 TABLET ORAL at 01:25

## 2018-11-18 RX ADMIN — OXYCODONE HYDROCHLORIDE AND ACETAMINOPHEN 2 TABLET: 5; 325 TABLET ORAL at 10:33

## 2018-11-18 RX ADMIN — OXYCODONE HYDROCHLORIDE AND ACETAMINOPHEN 2 TABLET: 5; 325 TABLET ORAL at 06:34

## 2018-11-18 RX ADMIN — HYDROXYZINE HYDROCHLORIDE 25 MG: 25 TABLET ORAL at 18:58

## 2018-11-18 RX ADMIN — FERROUS SULFATE TAB EC 325 MG (65 MG FE EQUIVALENT) 325 MG: 325 (65 FE) TABLET DELAYED RESPONSE at 18:55

## 2018-11-18 RX ADMIN — OXYCODONE HYDROCHLORIDE AND ACETAMINOPHEN 2 TABLET: 5; 325 TABLET ORAL at 14:59

## 2018-11-18 RX ADMIN — HYDROXYZINE HYDROCHLORIDE 25 MG: 25 TABLET ORAL at 10:33

## 2018-11-18 RX ADMIN — OXYCODONE HYDROCHLORIDE AND ACETAMINOPHEN 2 TABLET: 5; 325 TABLET ORAL at 02:19

## 2018-11-18 RX ADMIN — NAPROXEN 500 MG: 250 TABLET ORAL at 13:02

## 2018-11-18 RX ADMIN — FERROUS SULFATE TAB EC 325 MG (65 MG FE EQUIVALENT) 325 MG: 325 (65 FE) TABLET DELAYED RESPONSE at 08:51

## 2018-11-18 RX ADMIN — OXYCODONE HYDROCHLORIDE AND ACETAMINOPHEN 2 TABLET: 5; 325 TABLET ORAL at 18:55

## 2018-11-18 ASSESSMENT — PAIN SCALES - GENERAL
PAINLEVEL_OUTOF10: 7
PAINLEVEL_OUTOF10: 7
PAINLEVEL_OUTOF10: 5
PAINLEVEL_OUTOF10: 3
PAINLEVEL_OUTOF10: 6
PAINLEVEL_OUTOF10: 7
PAINLEVEL_OUTOF10: 7

## 2018-11-19 PROCEDURE — 1220000000 HC SEMI PRIVATE OB R&B

## 2018-11-19 PROCEDURE — S9443 LACTATION CLASS: HCPCS

## 2018-11-19 PROCEDURE — 6370000000 HC RX 637 (ALT 250 FOR IP): Performed by: STUDENT IN AN ORGANIZED HEALTH CARE EDUCATION/TRAINING PROGRAM

## 2018-11-19 RX ORDER — NAPROXEN 500 MG/1
500 TABLET ORAL EVERY 12 HOURS
Qty: 60 TABLET | Refills: 0 | Status: SHIPPED | OUTPATIENT
Start: 2018-11-19 | End: 2018-11-20

## 2018-11-19 RX ADMIN — NAPROXEN 500 MG: 250 TABLET ORAL at 12:07

## 2018-11-19 RX ADMIN — OXYCODONE HYDROCHLORIDE AND ACETAMINOPHEN 2 TABLET: 5; 325 TABLET ORAL at 00:00

## 2018-11-19 RX ADMIN — OXYCODONE HYDROCHLORIDE AND ACETAMINOPHEN 1 TABLET: 5; 325 TABLET ORAL at 16:49

## 2018-11-19 RX ADMIN — SENNOSIDES 8.6 MG: 8.6 TABLET, FILM COATED ORAL at 22:57

## 2018-11-19 RX ADMIN — OXYCODONE HYDROCHLORIDE AND ACETAMINOPHEN 1 TABLET: 5; 325 TABLET ORAL at 22:57

## 2018-11-19 RX ADMIN — NAPROXEN 500 MG: 250 TABLET ORAL at 00:01

## 2018-11-19 RX ADMIN — SENNOSIDES 8.6 MG: 8.6 TABLET, FILM COATED ORAL at 00:01

## 2018-11-19 RX ADMIN — OXYCODONE HYDROCHLORIDE AND ACETAMINOPHEN 1 TABLET: 5; 325 TABLET ORAL at 12:07

## 2018-11-19 RX ADMIN — OXYCODONE HYDROCHLORIDE AND ACETAMINOPHEN 2 TABLET: 5; 325 TABLET ORAL at 06:16

## 2018-11-19 ASSESSMENT — PAIN SCALES - GENERAL
PAINLEVEL_OUTOF10: 7
PAINLEVEL_OUTOF10: 6

## 2018-11-20 VITALS
HEART RATE: 80 BPM | OXYGEN SATURATION: 98 % | DIASTOLIC BLOOD PRESSURE: 83 MMHG | RESPIRATION RATE: 18 BRPM | SYSTOLIC BLOOD PRESSURE: 126 MMHG | TEMPERATURE: 98.7 F

## 2018-11-20 PROCEDURE — 6370000000 HC RX 637 (ALT 250 FOR IP): Performed by: STUDENT IN AN ORGANIZED HEALTH CARE EDUCATION/TRAINING PROGRAM

## 2018-11-20 RX ORDER — NAPROXEN 500 MG/1
500 TABLET ORAL EVERY 12 HOURS
Qty: 60 TABLET | Refills: 0 | Status: SHIPPED | OUTPATIENT
Start: 2018-11-20 | End: 2021-05-11

## 2018-11-20 RX ORDER — NAPROXEN 500 MG/1
500 TABLET ORAL EVERY 12 HOURS
Qty: 60 TABLET | Refills: 0 | Status: SHIPPED | OUTPATIENT
Start: 2018-11-20 | End: 2018-11-20 | Stop reason: SDUPTHER

## 2018-11-20 RX ADMIN — FERROUS SULFATE TAB EC 325 MG (65 MG FE EQUIVALENT) 325 MG: 325 (65 FE) TABLET DELAYED RESPONSE at 10:14

## 2018-11-20 RX ADMIN — OXYCODONE HYDROCHLORIDE AND ACETAMINOPHEN 2 TABLET: 5; 325 TABLET ORAL at 10:15

## 2018-11-20 RX ADMIN — NAPROXEN 500 MG: 250 TABLET ORAL at 00:44

## 2018-11-20 RX ADMIN — NAPROXEN 500 MG: 250 TABLET ORAL at 13:07

## 2018-11-20 RX ADMIN — OXYCODONE HYDROCHLORIDE AND ACETAMINOPHEN 2 TABLET: 5; 325 TABLET ORAL at 03:49

## 2018-11-20 ASSESSMENT — PAIN SCALES - GENERAL
PAINLEVEL_OUTOF10: 7
PAINLEVEL_OUTOF10: 5
PAINLEVEL_OUTOF10: 7
PAINLEVEL_OUTOF10: 4

## 2018-11-21 LAB — SURGICAL PATHOLOGY REPORT: NORMAL

## 2018-11-21 NOTE — TELEPHONE ENCOUNTER
OB/GYN Resident Refill Encounter      Received perfect serve message that the patient's Naproxen prescription was not sent to pharmacy. I have resent the Naproxen prescription at this time.        Sara Bhatti DO  OB/GYN Resident   [id number], [address]  11/20/2018, 7:07 PM

## 2018-12-10 ENCOUNTER — POSTPARTUM VISIT (OUTPATIENT)
Dept: OBGYN CLINIC | Age: 33
End: 2018-12-10

## 2018-12-10 VITALS
WEIGHT: 146 LBS | DIASTOLIC BLOOD PRESSURE: 60 MMHG | SYSTOLIC BLOOD PRESSURE: 114 MMHG | RESPIRATION RATE: 14 BRPM | BODY MASS INDEX: 25.06 KG/M2

## 2018-12-10 PROCEDURE — 99024 POSTOP FOLLOW-UP VISIT: CPT | Performed by: OBSTETRICS & GYNECOLOGY

## 2018-12-10 NOTE — PROGRESS NOTES
breastfeeding. Abdomen: Soft and non-tender; good bowel sounds; no guarding, rebound or rigidity; no CVA tenderness bilaterally. Incision: Clean, Dry and Intact without signs or symptoms of infection. Extremities: No calf tenderness bilaterally. DTR 2/4 bilaterally. No edema. Assessment:   Diagnosis Orders   1. RLTCS 18 M Apg  Wt 8#1     2. Postpartum care following  delivery       Chief Complaint   Patient presents with    Postpartum Care     EPDS Score of 0        Plan:  1. Return to the office in  3-4 weeks  2. Signs & Symptoms of mastitis reviewed; notify if occurs  3. Secondary smoke risks reviewed. Increased risks of respiratory problems, Sudden     infant death syndrome, and potential malignancies. 4. Abstinence  5. Family planning counseling and STD counseling completed  6. Continue with post operative restrictions  7. No lifting or [place]  8.

## 2019-03-12 ENCOUNTER — HOSPITAL ENCOUNTER (OUTPATIENT)
Facility: CLINIC | Age: 34
Discharge: HOME | End: 2019-03-12
Payer: COMMERCIAL

## 2019-03-12 LAB
THYROXINE, FREE: 1.47 NG/DL (ref 0.93–1.7)
TSH SERPL DL<=0.05 MIU/L-ACNC: 0.56 MIU/L (ref 0.3–5)

## 2019-03-12 PROCEDURE — 84443 ASSAY THYROID STIM HORMONE: CPT

## 2019-03-12 PROCEDURE — 36415 COLL VENOUS BLD VENIPUNCTURE: CPT

## 2019-03-12 PROCEDURE — 84439 ASSAY OF FREE THYROXINE: CPT

## 2021-02-04 ENCOUNTER — HOSPITAL ENCOUNTER (OUTPATIENT)
Age: 36
Setting detail: SPECIMEN
Discharge: HOME | End: 2021-02-04
Payer: COMMERCIAL

## 2021-02-04 ENCOUNTER — INITIAL PRENATAL (OUTPATIENT)
Dept: OBGYN CLINIC | Age: 36
End: 2021-02-04

## 2021-02-04 VITALS — DIASTOLIC BLOOD PRESSURE: 80 MMHG | WEIGHT: 134 LBS | SYSTOLIC BLOOD PRESSURE: 122 MMHG | BODY MASS INDEX: 23 KG/M2

## 2021-02-04 DIAGNOSIS — E05.90 HYPERTHYROIDISM: ICD-10-CM

## 2021-02-04 DIAGNOSIS — O09.293 HX OF PREECLAMPSIA, PRIOR PREGNANCY, CURRENTLY PREGNANT, THIRD TRIMESTER: ICD-10-CM

## 2021-02-04 DIAGNOSIS — Z34.81 NORMAL PREGNANCY IN MULTIGRAVIDA IN FIRST TRIMESTER: Primary | ICD-10-CM

## 2021-02-04 DIAGNOSIS — Z34.81 NORMAL PREGNANCY IN MULTIGRAVIDA IN FIRST TRIMESTER: ICD-10-CM

## 2021-02-04 DIAGNOSIS — Z3A.08 8 WEEKS GESTATION OF PREGNANCY: ICD-10-CM

## 2021-02-04 LAB
ABO/RH: NORMAL
ALBUMIN SERPL-MCNC: 4.2 G/DL (ref 3.5–5.2)
ALBUMIN/GLOBULIN RATIO: 1.2 (ref 1–2.5)
ALP BLD-CCNC: 56 U/L (ref 35–104)
ALT SERPL-CCNC: 10 U/L (ref 5–33)
ANION GAP SERPL CALCULATED.3IONS-SCNC: 14 MMOL/L (ref 9–17)
ANTIBODY SCREEN: NEGATIVE
AST SERPL-CCNC: 16 U/L
BILIRUB SERPL-MCNC: 0.21 MG/DL (ref 0.3–1.2)
BILIRUBIN URINE: NEGATIVE
BUN BLDV-MCNC: 8 MG/DL (ref 6–20)
BUN/CREAT BLD: ABNORMAL (ref 9–20)
CALCIUM SERPL-MCNC: 9.7 MG/DL (ref 8.6–10.4)
CHLORIDE BLD-SCNC: 102 MMOL/L (ref 98–107)
CO2: 21 MMOL/L (ref 20–31)
COLOR: YELLOW
COMMENT UA: NORMAL
CREAT SERPL-MCNC: 0.47 MG/DL (ref 0.5–0.9)
GFR AFRICAN AMERICAN: >60 ML/MIN
GFR NON-AFRICAN AMERICAN: >60 ML/MIN
GFR SERPL CREATININE-BSD FRML MDRD: ABNORMAL ML/MIN/{1.73_M2}
GFR SERPL CREATININE-BSD FRML MDRD: ABNORMAL ML/MIN/{1.73_M2}
GLUCOSE BLD-MCNC: 98 MG/DL (ref 70–99)
GLUCOSE URINE: NEGATIVE
KETONES, URINE: NEGATIVE
LEUKOCYTE ESTERASE, URINE: NEGATIVE
NITRITE, URINE: NEGATIVE
PH UA: 6.5 (ref 5–8)
POTASSIUM SERPL-SCNC: 4.2 MMOL/L (ref 3.7–5.3)
PROTEIN UA: NEGATIVE
SODIUM BLD-SCNC: 137 MMOL/L (ref 135–144)
SPECIFIC GRAVITY UA: 1.01 (ref 1–1.03)
TOTAL PROTEIN: 7.8 G/DL (ref 6.4–8.3)
TSH SERPL DL<=0.05 MIU/L-ACNC: 1.1 MIU/L (ref 0.3–5)
TURBIDITY: CLEAR
URINE HGB: NEGATIVE
UROBILINOGEN, URINE: NORMAL

## 2021-02-04 PROCEDURE — 0500F INITIAL PRENATAL CARE VISIT: CPT | Performed by: NURSE PRACTITIONER

## 2021-02-04 NOTE — PATIENT INSTRUCTIONS
After Hours Number: You can call the office [phone]  or [phone]  Call if you have:  1. Bleeding like a period  2. Cramps or contractions greater than 2 hours  3. If you are leaking fluid  4. If you've a fever greater than 100°  5. If you feel as if baby is not moving  6. If you have continuous vomiting over 3-4 hours        Patient was counseled on weight gain in pregnancy with the following recommendation made based on her BMI:     Singletons:  BMI <18.5: 28-40 lbs weight gain  BMI 18.5-24.9: 25-35 lbs weight gain   BMI 25-29.9: 15-25 lbs weight gain  BMI >/= 30: 11-20 lbs weight gain    Up to 16 weeks around 1 pound a month  16-28 weeks- 2-4 pounds a month  >28 weeks - around 1 pound a week due to increased growth and blood volume      Twins:  BMI <18.5: no reccomendations  BMI 18.5-24.9: 37-45 lbs weight gain  BMI 25-29.9: 31-50 lbs weight gain  BMI >/= 30: 25-42 lbs weight gain    During Pregnancy: Exercises  Your Care Instructions  Here are some examples of exercises to do during your pregnancy. Start each exercise slowly. Ease off the exercise if you start to have pain. Your doctor or physical therapist will tell you when you can start these exercises and which ones will work best for you. How to do the exercises  Neck rotation    1. Sit in a firm chair, or stand up straight. 2. Keeping your chin level, turn your head to the right, and hold for 15 to 30 seconds. 3. Turn your head to the left and hold for 15 to 30 seconds. 4. Repeat 2 to 4 times to each side. Forward neck flexion    1. Sit in a firm chair, or stand up straight. 2. Bend your head forward. 3. Hold for 15 to 30 seconds. 4. Repeat 2 to 4 times. Back press    1. Place your feet 10 to 12 inches from the wall. 2. Rest your back flat against the wall and slide down the wall until your knees are slightly bent. 3. Press your lower back against the wall by pulling in your stomach muscles. 4. Hold for 6 seconds, and then relax your stomach muscles and slide back up the wall. 5. Repeat 8 to 12 times. Full body twist    1. Sit with your legs crossed. 2. Reach your left hand toward your right foot, and place your right hand at your side for support. 3. Slowly twist your torso to your right. 4. Switch your hands and twist to your left. 5. Repeat 2 to 4 times. Pelvic rocking    1. Kneeling on hands and knees, place your hands directly under your shoulders and your knees under your hips. 2. Breathe in deeply. Tuck your head downward and round your back up, making a curve with your back in the shape of the letter C. Hold this position for a count of 6.  3. Breathe out slowly and bring your head back up. Relax, keeping your back straight (don't allow it to curve toward the floor). Hold this for a count of 6.  4. Do this exercise 8 times or to your comfort level. Pelvic tilt    1. Lie on your back. 2. Keep your knees relaxed. 3. Tighten your belly and buttocks muscles. 4. At the same time, gently shift your pelvis upward. This should flatten the curve in your back. 5. Hold for 6 seconds and then relax. 6. Gradually increase the number of tilts you do each day, to your comfort level. Backward stretch    1. Kneel on hands and knees with your knees 8 to 10 inches apart, hands directly under your shoulders, and arms and back straight. 2. Keeping your arms straight, slowly lower your buttocks toward your heels and tuck your head toward your knees. Hold for 15 to 30 seconds. 3. Slowly return to the kneeling position. 4. Repeat 2 to 4 times. Forward bend    1. Sit comfortably in a chair, with your arms relaxed. 2. Slowly bend forward, allowing your arms to hang down in front of you. Lean only as far as you can without feeling discomfort or pressure on your belly. 3. Hold for 15 to 30 seconds and then slowly sit up straight. 4. Repeat 2 to 4 times or to your comfort level. Leg lift crawl    1. Kneeling on hands and knees, place your hands directly under your shoulders and straighten your arms. 2. Tighten your belly muscles by pulling in your belly button toward your spine. Be sure you continue to breathe normally and do not hold your breath. 3. Lift your left knee and bring it toward your elbow. 4. Slowly extend your leg behind you without completely straightening it. Be careful not to let your hip drop down. Avoid arching your back. 5. Hold your leg behind you for about 6 seconds. 6. Return to your starting position. 7. Do the same exercise with your other leg. 8. Repeat 8 to 12 times for each leg. Tailor sitting    1. Sit on the floor. 2. Bring your feet close to your body while crossing your ankles. 3. Hold this position for as long as you are comfortable. Tailor stretching    1. Sit on the floor with your back straight, legs about 12 inches apart, and feet relaxed outward. 2. Stretch your hands forward toward your left foot, then sit up. 3. Stretch your hands straight forward, then sit up. 4. Stretch your hands forward toward your right foot, then sit up. 5. Hold each stretch for 15 to 30 seconds. 6. Repeat 2 to 4 times. Follow-up care is a key part of your treatment and safety. Be sure to make and go to all appointments, and call your doctor if you are having problems. It's also a good idea to know your test results and keep a list of the medicines you take. Where can you learn more? Go to https://silvestre.Cantargia. org and sign in to your [employer] Library account. Enter Q687 in the Flixel Photos box to learn more about \"During Pregnancy: Exercises. \"     If you do not have an account, please click on the \"Sign Up Now\" link.   Current as of: September 5, 2018  Content Version: 12.0 © 1951-2555 Healthwise, Incorporated. Care instructions adapted under license by [facility] ([facility]). If you have questions about a medical condition or this instruction, always ask your healthcare professional. [address] any warranty or liability for your use of this information. Nutrition During Pregnancy: Care Instructions  Your Care Instructions    Healthy eating when you are pregnant is important for you and your baby. It can help you feel well and have a successful pregnancy and delivery. During pregnancy your nutrition needs increase. Even if you have excellent eating habits, your doctor may recommend a multivitamin to make sure you get enough iron and folic acid. Many pregnant women wonder how much weight they should gain. In general, women who were at a healthy weight before they became pregnant should gain between 25 and 35 pounds. Women who were overweight before pregnancy are usually advised to gain 15 to 25 pounds. Women who were underweight before pregnancy are usually advised to gain 28 to 40 pounds. Your doctor will work with you to set a weight goal that is right for you. Gaining a healthy amount of weight helps you have a healthy baby. Follow-up care is a key part of your treatment and safety. Be sure to make and go to all appointments, and call your doctor if you are having problems. It's also a good idea to know your test results and keep a list of the medicines you take. How can you care for yourself at home? · Eat plenty of fruits and vegetables. Include a variety of orange, yellow, and leafy dark-green vegetables every day. · Choose whole-grain bread, cereal, and pasta.  Good choices include whole wheat bread, whole wheat pasta, brown rice, and oatmeal. · Get 4 or more servings of milk and milk products each day. Good choices include nonfat or low-fat milk, yogurt, and cheese. If you cannot eat milk products, you can get calcium from calcium-fortified products such as orange juice, soy milk, and tofu. Other non-milk sources of calcium include leafy green vegetables, such as broccoli, kale, mustard greens, turnip greens, bok michelle, and brussels sprouts. · If you eat meat, pick lower-fat types. Good choices include lean cuts of meat and chicken or turkey without the skin. · Do not eat shark, swordfish, jeromy mackerel, or tilefish. They have high levels of mercury, which is dangerous to your baby. You can eat up to 12 ounces a week of fish or shellfish that have low mercury levels. Good choices include shrimp, wild salmon, pollock, and catfish. Do not eat more than 6 ounces of tuna each week. · Heat lunch meats (such as turkey, ham, or bologna) to 165°F before you eat them. This reduces your risk of getting sick from a kind of bacteria that can be found in lunch meats. · Do not eat unpasteurized soft cheeses, such as brie, feta, fresh mozzarella, and blue cheese. They have a bacteria that could harm your baby. · Limit caffeine. If you drink coffee or tea, have no more than 1 cup a day. Caffeine is also found in miguel. · Do not drink any alcohol. No amount of alcohol has been found to be safe during pregnancy. · Do not diet or try to lose weight. For example, do not follow a low-carbohydrate diet. If you are overweight at the start of your pregnancy, your doctor will work with you to manage your weight gain. · Tell your doctor about all vitamins and supplements you take. When should you call for help? Watch closely for changes in your health, and be sure to contact your doctor if you have any problems. Where can you learn more? · Drink plenty of fluids, enough so that your urine is light yellow or clear like water. If you have kidney, heart, or liver disease and have to limit fluids, talk with your doctor before you increase the amount of fluids you drink. Some women find that peppermint tea helps with nausea. · Eat more protein, such as chicken, fish, lean meat, beans, nuts, and seeds. · Eat carbohydrate foods, such as potatoes, whole-grain cereals, rice, and pasta. · Avoid smells and foods that make you feel nauseated. Spicy or high-fat foods, citrus juice, milk, coffee, and tea with caffeine often make nausea worse. · Do not drink alcohol. · Do not smoke. Try not to be around others who smoke. If you need help quitting, talk to your doctor about stop-smoking programs and medicines. These can increase your chances of quitting for good. · If you are taking iron supplements, ask your doctor if they are necessary. Iron can make nausea worse. · Get lots of rest. Stress and fatigue can make your morning sickness worse. · Ask your doctor about taking prescription medicine, or over-the-counter products such as vitamin B6, doxylamine, or alphonso, to relieve your symptoms. Your doctor can tell you the doses that are safe for you. · Take your prenatal vitamins at night on a full stomach. When should you call for help? Call 911 anytime you think you may need emergency care. For example, call if:    · You passed out (lost consciousness). Call your doctor now or seek immediate medical care if:    · You are sick to your stomach or cannot drink fluids. · You have symptoms of dehydration, such as:  ? Dry eyes and a dry mouth. ? Passing only a little urine. ? Feeling thirstier than usual.     · You are not able to keep down your medicine. · You have pain in your belly or pelvis. Watch closely for changes in your health, and be sure to contact your doctor if:    · You do not get better as expected. Where can you learn more? Go to https://chpepiceweb.healthEbid.co.zw. org and sign in to your [employer] account. Enter S733 in the Zipideehire box to learn more about \"Managing Morning Sickness: Care Instructions. \"     If you do not have an account, please click on the \"Sign Up Now\" link. Current as of: September 5, 2018  Content Version: 12.0  © 7717-5892 [employer]. Care instructions adapted under license by [facility] ([facility]). If you have questions about a medical condition or this instruction, always ask your healthcare professional. [place] [address] any warranty or liability for your use of this information. Breastfeeding: Care Instructions  Overview      Breastfeeding has many benefits. It may lower your baby's chances of getting an infection. It also may make it less likely that your baby will have problems such as diabetes and obesity later in life. Breastfeeding also helps you bond with your baby. In the first days after birth, your breasts make a thick, yellow liquid called colostrum. This liquid gives your baby nutrients and antibodies against infection. It is all that babies need in the first days after birth. Your breasts will fill with milk a few days after the birth. Breastfeeding is a skill that gets better with practice. Be patient with yourself and your baby. If you have trouble, you can get help and keep breastfeeding. Follow-up care is a key part of your treatment and safety. Be sure to make and go to all appointments, and call your doctor if you are having problems. It's also a good idea to know your test results and keep a list of the medicines you take. How can you care for yourself at home? · Breastfeed your baby whenever he or she is hungry. In the first 2 weeks, your baby will feed about every 1 to 3 hours. That often works out to about 8 to 12 times in a 24-hour period. This will help you keep up your supply of milk.  Signs that your baby is hungry include: ? Sucking on his or her hands. ? Amelia his or her lips. ? Turning his or her head toward your breast.  · Put a bed pillow or a nursing pillow on your lap to support your arms and your baby. · Hold your baby in a comfortable position. ? You can hold your baby in several ways. One of the most common positions is the cradle hold. One arm supports your baby, with his or her head in the bend of your elbow. Your open hand supports your baby's bottom or back. Your baby's belly lies against yours. ? If you had your baby by , or , try the football hold. This position keeps your baby off your belly. Tuck your baby under your arm, with his or her body along the side you will be feeding on. Support your baby's upper body with your arm. With that hand you can control your baby's head to bring his or her mouth to your breast.  ? Try different positions with each feeding. If you are having problems, ask for help from your doctor or a lactation consultant. · To get your baby to latch on:  ? Support and narrow your breast with one hand using a \"U hold,\" with your thumb on the outer side of your breast and your fingers on the inner side. You can also use a \"C hold,\" with all your fingers below the nipple and your thumb above it. Try the different holds to get the deepest latch for whichever breastfeeding position you use. Your other arm is behind your baby's back, with your hand supporting the base of the baby's head. Position your fingers and thumb to point toward your baby's ears. ? You can touch your baby's lower lip with your nipple to get your baby to open his or her mouth. Wait until your baby opens up really wide, like a big yawn. Then be sure to bring the baby quickly to your breastnot your breast to the baby. As you bring your baby toward your breast, use your other hand to support the breast and guide it into his or her mouth. ? Both the nipple and a large portion of the darker area around the nipple (areola) should be in the baby's mouth. The baby's lips should be flared outward, not folded in (inverted). ? Listen for a regular sucking and swallowing pattern while the baby is feeding. If you cannot see or hear a swallowing pattern, watch the baby's ears, which will wiggle slightly when the baby swallows. If the baby's nose appears to be blocked by your breast, bring your baby's body closer to you. This will help tilt the baby's head back slightly, so just the edge of one nostril is clear for breathing. ? When your baby is latched, you can usually remove your hand from supporting your breast and bring it under your baby to cradle him or her. Now just relax and breastfeed your baby. · You will know that your baby is feeding well when:  ? His or her mouth covers a lot of the areola, and the lips are flared out.  ? His or her chin and nose rest against your breast.  ? Sucking is deep and rhythmic, with short pauses. ? You are able to see and hear your baby swallowing. ? You do not feel pain in your nipple. · Offer both breasts to your baby at each feeding. Each time you breastfeed, switch which breast you start with. · Anytime you need to remove your baby from the breast, put one finger in the corner of his or her mouth. Push your finger between your baby's gums to gently break the seal. If you do not break the tight seal before you remove your baby, your nipples can become sore, cracked, or bruised. · After feeding your baby, gently pat his or her back to let out any swallowed air. After your baby burps, offer the breast again, or offer the other breast. Sometimes a baby will want to keep feeding after being burped. When should you call for help?   Call your doctor now or seek immediate medical care if:    · You have symptoms of a breast infection, such as:  ? Increased pain, swelling, redness, or warmth around a breast. ? Red streaks extending from the breast.  ? Pus draining from a breast.  ? A fever. · Your baby has no wet diapers for 6 hours. Watch closely for changes in your health, and be sure to contact your doctor if:    · Your baby has trouble latching on to your breast.     · You continue to have pain or discomfort when breastfeeding. · You have other questions or concerns. Where can you learn more? Go to https://Birks & Mayorspepiceweb.Sprint [email or web address]. org and sign in to your [employer] account. Enter P492 in the PlaceVine box to learn more about \"Breastfeeding: Care Instructions. \"     If you do not have an account, please click on the \"Sign Up Now\" link. Current as of: September 5, 2018  Content Version: 12.0  © 4305-6075 NOW! Innovations. Care instructions adapted under license by [facility] ([facility]). If you have questions about a medical condition or this instruction, always ask your healthcare professional. [place] [address] any warranty or liability for your use of this information. Learning About Birth Defects Testing  What is birth defects testing? Birth defects testing is done during pregnancy to look for possible problems with the baby (fetus). A birth defect may have only a minor impact on a child's life. Or it can have a major effect on quality or length of life. You and your doctor can choose from many tests. You may have no tests, one test, or many tests. Talking with a genetic counselor may help you make decisions about testing. The counselor is trained to help you understand these tests. He or she can also help you find resources for support. What are the types of tests? You may have a screening test or a diagnostic test. Or you may have both types of tests. Screening tests show the chance that a baby has a certain birth defect, such as Down syndrome, spina bifida, or trisomy 25. Diagnostic tests show if a baby has a certain birth defect. There is a small risk of a miscarriage after a CVS or amniocentesis. Your doctor or genetic counselor can help you understand this risk. These tests are generally very safe. Where can you learn more? Go to https://Vennlipechiki.KAJ Hospitality. org and sign in to your [employer] account. Enter G030 in the [facility] box to learn more about \"Learning About Birth Defects Testing. \"     If you do not have an account, please click on the \"Sign Up Now\" link. Current as of: September 5, 2018  Content Version: 12.0  © 4813-3607 [employer]. Care instructions adapted under license by [facility] ([facility]). If you have questions about a medical condition or this instruction, always ask your healthcare professional. [address] any warranty or liability for your use of this information. Patient Education        Subchorionic Hematoma: Care Instructions  Your Care Instructions     A subchorionic hematoma or hemorrhage is bleeding under one of the membranes (chorion) that surrounds the embryo inside the uterus. It is a common cause of bleeding in early pregnancy. The main symptom is vaginal bleeding. But some women don't have symptoms. They may find out they have a hematoma during an ultrasound test.  In most cases, the bleeding goes away on its own. Most women go on to have a healthy baby. But in some cases, the bleeding is a sign of a miscarriage or other problem with the pregnancy. Your doctor may want to do a follow-up ultrasound. Follow-up care is a key part of your treatment and safety. Be sure to make and go to all appointments, and call your doctor if you are having problems. It's also a good idea to know your test results and keep a list of the medicines you take. How can you care for yourself at home? · Keep track of any bleeding, and follow the guidelines for when to call your doctor. · Keep in mind that some bleeding during the first trimester or an abnormal finding on an ultrasound may:  ? Not cause any problems for you or the baby. ? Turn out to be something more serious. But if this happens, it's best to find out early. Then you and your doctor can manage any complications sooner rather than later. When should you call for help? Call 911 anytime you think you may need emergency care. For example, call if:    · You have sudden, severe pain in your belly or pelvis.     · You passed out (lost consciousness).     · You have severe vaginal bleeding. Call your doctor now or seek immediate medical care if:    · You are dizzy or lightheaded, or you feel like you may faint.     · You have new or increased pain in your belly or pelvis.     · You have new or more vaginal bleeding.     · You have pain in the vaginal area.     · You have a fever.     · You think you may have passed tissue. Save any tissue that you pass. Take it to your doctor's office as soon as you can. Watch closely for changes in your health, and be sure to contact your doctor if:    · You have new or worse vaginal symptoms, such as pain, itching, or a discharge.     · You do not get better as expected. Where can you learn more? Go to https://streamOnce.PoachIt. org and sign in to your [employer] account. Enter P451 in the [employer] box to learn more about \"Subchorionic Hematoma: Care Instructions. \"     If you do not have an account, please click on the \"Sign Up Now\" link. Current as of: February 11, 2020               Content Version: 12.6  © 8884-5978 UmBio, Incorporated. Care instructions adapted under license by [facility] ([facility]). If you have questions about a medical condition or this instruction, always ask your healthcare professional. [place] [address] any warranty or liability for your use of this information.

## 2021-02-04 NOTE — PROGRESS NOTES
OB History    Para Term  AB Living   4 3 3 0 0 3   SAB TAB Ectopic Molar Multiple Live Births   0 0 0   0 3      # Outcome Date GA Lbr Usman/2nd Weight Sex Delivery Anes PTL Lv   4 Current            3 Term 18 38w1d  8 lb 1.5 oz (3.671 kg) M CS-LTranv   MARLEN   2 Term 02/03/15 38w0d  7 lb 12.5 oz (3.53 kg) M CS-LTranv Spinal  MARLEN   1 Term 2014 39w0d  8 lb (3.629 kg) F CS-LTranv  N MARLEN      Silvia Ness is being seen today for her new obstetrical visit. The pregnancy is  a planned pregnancy. She is  accepting at this time. Her last period was Patient's last menstrual period was 2020. Poonam Guevara This was a sure and definite menses. She was not on OCP at conception. Gestation 8w2d  Estimated Date of Delivery: 21  Last PAP 2017-negative, hx abnormal: denies    Repeat     Plans to deliver at: [facility]  Plans to breastfeed: yes  SO/Partner:  Eligio  Involved:  yes  Patient Ethnicity:    FOB Ethnicity:         Occupation:         Pets:  no    Teratogen exposure since LMP: (OTC/prescription/ETOH/drugs):  denies    History of prior GBS-infected child:  no  Recent travel outside of country (partner also):  no    Known COVID/Zika exposure (partner also): no  Any history of genetic or chromosomal aberations in herself the father to be or their respective families: no  Any histories of spina bifida or abdominal wall defects; omphalocele's or gastroschisis no  Hx Hypothyroidism: no but hx hyperthyroidism after 2nd pregnancy no meds. Hx preeclampsia or HTN:  Preeclampsia with 2nd pregnancy.    Hx PPD: denies  Hx Diabetes: denies  Hx GDM: denies  First degree relative with diabetes: denies            Allergies   Allergen Reactions    Amoxicillin-Pot Clavulanate     Codeine Rash    Sulfa Antibiotics Rash     Current Outpatient Medications   Medication Sig Dispense Refill  naproxen (NAPROSYN) 500 MG tablet Take 1 tablet by mouth every 12 hours 60 tablet 0    ferrous sulfate 325 (65 Fe) MG EC tablet Take 1 tablet by mouth 2 times daily (with meals) 90 tablet 3    DiphenhydrAMINE HCl (BENADRYL ALLERGY CHILDRENS PO) Take by mouth      Pediatric Multivit-Minerals-C (RA GUMMY VITAMINS & MINERALS) CHEW Take by mouth       No current facility-administered medications for this visit. Past Medical History:   Diagnosis Date    Hyperthyroidism     referral sent to dr. Tucker Mae allergies      Past Surgical History:   Procedure Laterality Date     SECTION  2014     SECTION, LOW TRANSVERSE  12/5/15    MI  DELIVERY ONLY N/A 2018     SECTION performed by Lara Wood DO at [facility] L&D OR         Swelling: no  Bleeding: no  Discharge: no   Dysuria: no  Nausea: no encouraged small frequent meals, and vitamin B6 and unisom if needed. Heartburn: no  Epigastric pain: no       Office protocol reviewed, After hours number provided. Diet and exercise reveiwed  Warning signs reviewed  Testing reviewed     Q&A  Refer no  for 1st trimester screen/cfDNA, info provided for screening, Discussed dates and orders for Anatomy USd and fetal echo if indicated. Breastfeeding education. She verbally consented to HIV testing and drug screening. She was counseled on Toxoplasmosis/Listeriosis (cats/raw meat). Prenatal Vitamins recommended. Pelvic exam completed PAP and cultures obtained and sent. Prenatal labs and dating us ordered. She has hx hyperthyroidism- check tsh  Hx preeclampsia-   ultrasound did show a small subchorionic bleed  Can be normal as sac implantswe will monitor, some women may see bleeding, monitor. Generally resolves spontaneously, I would encourage pelvic rest and keep lifting to less than 20 pounds recheck us in 2 weeks.          RV prn/ 4 weeks Of the 30 minute duration appointment visit, Tamika Benitez CNP spent at least 50% of the face-to-face time in counseling, explanation of diagnosis, planning of further management, and answering all questions.

## 2021-02-05 DIAGNOSIS — Z3A.08 8 WEEKS GESTATION OF PREGNANCY: ICD-10-CM

## 2021-02-05 DIAGNOSIS — Z34.81 NORMAL PREGNANCY IN MULTIGRAVIDA IN FIRST TRIMESTER: ICD-10-CM

## 2021-02-05 LAB
ABSOLUTE EOS #: 0.09 K/UL (ref 0–0.44)
ABSOLUTE IMMATURE GRANULOCYTE: 0.03 K/UL (ref 0–0.3)
ABSOLUTE LYMPH #: 1.88 K/UL (ref 1.1–3.7)
ABSOLUTE MONO #: 0.6 K/UL (ref 0.1–1.2)
AMPHETAMINE SCREEN URINE: NEGATIVE
BARBITURATE SCREEN URINE: NEGATIVE
BASOPHILS # BLD: 1 % (ref 0–2)
BASOPHILS ABSOLUTE: 0.04 K/UL (ref 0–0.2)
BENZODIAZEPINE SCREEN, URINE: NEGATIVE
BUPRENORPHINE URINE: NORMAL
CANNABINOID SCREEN URINE: NEGATIVE
COCAINE METABOLITE, URINE: NEGATIVE
CREATININE URINE: 39.4 MG/DL (ref 28–217)
CULTURE: NORMAL
DIFFERENTIAL TYPE: ABNORMAL
DIRECT EXAM: NORMAL
EOSINOPHILS RELATIVE PERCENT: 1 % (ref 1–4)
HCT VFR BLD CALC: 36.2 % (ref 36.3–47.1)
HEMOGLOBIN: 12.1 G/DL (ref 11.9–15.1)
HEPATITIS B SURFACE ANTIGEN: NONREACTIVE
HIV AG/AB: NONREACTIVE
IMMATURE GRANULOCYTES: 0 %
LYMPHOCYTES # BLD: 21 % (ref 24–43)
Lab: NORMAL
Lab: NORMAL
MCH RBC QN AUTO: 32.3 PG (ref 25.2–33.5)
MCHC RBC AUTO-ENTMCNC: 33.4 G/DL (ref 28.4–34.8)
MCV RBC AUTO: 96.5 FL (ref 82.6–102.9)
MDMA URINE: NORMAL
METHADONE SCREEN, URINE: NEGATIVE
METHAMPHETAMINE, URINE: NORMAL
MONOCYTES # BLD: 7 % (ref 3–12)
NRBC AUTOMATED: 0 PER 100 WBC
OPIATES, URINE: NEGATIVE
OXYCODONE SCREEN URINE: NEGATIVE
PDW BLD-RTO: 11.9 % (ref 11.8–14.4)
PHENCYCLIDINE, URINE: NEGATIVE
PLATELET # BLD: 395 K/UL (ref 138–453)
PLATELET ESTIMATE: ABNORMAL
PMV BLD AUTO: 9.6 FL (ref 8.1–13.5)
PROPOXYPHENE, URINE: NORMAL
RBC # BLD: 3.75 M/UL (ref 3.95–5.11)
RBC # BLD: ABNORMAL 10*6/UL
RUBV IGG SER QL: 116.6 IU/ML
SEG NEUTROPHILS: 70 % (ref 36–65)
SEGMENTED NEUTROPHILS ABSOLUTE COUNT: 6.22 K/UL (ref 1.5–8.1)
SPECIMEN DESCRIPTION: NORMAL
SPECIMEN DESCRIPTION: NORMAL
T. PALLIDUM, IGG: NONREACTIVE
TEST INFORMATION: NORMAL
TOTAL PROTEIN, URINE: <4 MG/DL
TRICYCLIC ANTIDEPRESSANTS, UR: NORMAL
URINE TOTAL PROTEIN CREATININE RATIO: NORMAL (ref 0–0.2)
WBC # BLD: 8.9 K/UL (ref 3.5–11.3)
WBC # BLD: ABNORMAL 10*3/UL

## 2021-02-10 LAB
HPV SAMPLE: NORMAL
HPV, GENOTYPE 16: NOT DETECTED
HPV, GENOTYPE 18: NOT DETECTED
HPV, HIGH RISK OTHER: NOT DETECTED
HPV, INTERPRETATION: NORMAL
SPECIMEN DESCRIPTION: NORMAL

## 2021-02-17 LAB — CYTOLOGY REPORT: NORMAL

## 2021-03-02 ENCOUNTER — ROUTINE PRENATAL (OUTPATIENT)
Dept: OBGYN CLINIC | Age: 36
End: 2021-03-02

## 2021-03-02 VITALS
DIASTOLIC BLOOD PRESSURE: 62 MMHG | SYSTOLIC BLOOD PRESSURE: 112 MMHG | WEIGHT: 133.38 LBS | BODY MASS INDEX: 22.89 KG/M2

## 2021-03-02 DIAGNOSIS — Z34.91 NORMAL PREGNANCY IN FIRST TRIMESTER: Primary | ICD-10-CM

## 2021-03-02 DIAGNOSIS — Z3A.12 12 WEEKS GESTATION OF PREGNANCY: ICD-10-CM

## 2021-03-02 PROCEDURE — 0502F SUBSEQUENT PRENATAL CARE: CPT | Performed by: OBSTETRICS & GYNECOLOGY

## 2021-03-02 NOTE — PROGRESS NOTES
Blanca Cortez is a 28 y.o. female 12w0d        OB History    Para Term  AB Living   4 3 3 0 0 3   SAB TAB Ectopic Molar Multiple Live Births   0 0 0   0 3      # Outcome Date GA Lbr Usman/2nd Weight Sex Delivery Anes PTL Lv   4 Current            3 Term 18 38w1d  8 lb 1.5 oz (3.671 kg) M CS-LTranv   MARLEN   2 Term 02/03/15 38w0d  7 lb 12.5 oz (3.53 kg) M CS-LTranv Spinal  MARLEN   1 Term 2014 39w0d  8 lb (3.629 kg) F CS-LTranv  N MARLEN             Vitals  BP: 112/62  Weight: 133 lb 6 oz (60.5 kg)  Patient Position: Sitting  Fetal Heart Rate: 167      No VB  NO Complaints or issues  Is contemplating doing NIPT testing - needs to contact insurance first to find out if it is covered by insurance.   Hx of C/S x3  Thin Lower Uterine Segment noted on last C/S  Hx of Pre Eclampsia   Hyperthyroidism- No Meds  Subchorionic seen on dating ultrasound 21  AMA  Plans to deliver at [address] [place] [address]          Assessment:  1. Blanca Cortez is a 28 y.o. female  2.  [id number]  3. 12w0d    Patient Active Problem List    Diagnosis Date Noted    Suspected LGA 2018    Hyperthyroidism (no meds) 2018    HRP (high risk pregnancy), third trimester 2018    Oligohydramnios 2018    RLTCS 18 M Apg 8/9 Wt 8#1 2018    At risk for impaired parent-infant bonding 2018    LGA (large for gestational age) fetus affecting mother, antepartum 2018    Suspected placental problem not found 2018    Hx of preeclampsia (G2) 2018    Encounter for supervision of normal pregnancy in multigravida in third trimester 2018    Anemia  2018     Iron supplementation started      Low implantation of placenta without hemorrhage 2018    Hx of preeclampsia, prior pregnancy, currently pregnant, second trimester 2018    Normal pregnancy in multigravida in second trimester 2018    Prior CS x 2 10/01/2014    R complex Adnexal cyst (2.74 x 1.41 x 1.52cm 10/25/2013     2.74 x 1.41 x 1.52cm          Diagnosis Orders   1.  Normal pregnancy in first trimester     2. 12 weeks gestation of pregnancy           Plan:  RTO 4 weeks  Information for NIPT given to patient   Will be RLTCS due to prior X 3

## 2021-04-05 ENCOUNTER — ROUTINE PRENATAL (OUTPATIENT)
Dept: OBGYN CLINIC | Age: 36
End: 2021-04-05

## 2021-04-05 VITALS — WEIGHT: 139 LBS | DIASTOLIC BLOOD PRESSURE: 70 MMHG | BODY MASS INDEX: 23.86 KG/M2 | SYSTOLIC BLOOD PRESSURE: 118 MMHG

## 2021-04-05 DIAGNOSIS — Z3A.16 16 WEEKS GESTATION OF PREGNANCY: ICD-10-CM

## 2021-04-05 DIAGNOSIS — Z34.82 NORMAL PREGNANCY IN MULTIGRAVIDA IN SECOND TRIMESTER: Primary | ICD-10-CM

## 2021-04-05 PROCEDURE — 0502F SUBSEQUENT PRENATAL CARE: CPT | Performed by: OBSTETRICS & GYNECOLOGY

## 2021-04-06 NOTE — PROGRESS NOTES
Yamileth Monsalve is a 28 y.o. female 17w0d        OB History    Para Term  AB Living   4 3 3 0 0 3   SAB TAB Ectopic Molar Multiple Live Births   0 0 0   0 3      # Outcome Date GA Lbr Usman/2nd Weight Sex Delivery Anes PTL Lv   4 Current            3 Term 18 38w1d  8 lb 1.5 oz (3.671 kg) M CS-LTranv   MARLEN   2 Term 02/03/15 38w0d  7 lb 12.5 oz (3.53 kg) M CS-LTranv Spinal  MARLEN   1 Term 2014 39w0d  8 lb (3.629 kg) F CS-LTranv  N MARLEN             Vitals  BP: 118/70  Weight: 139 lb (63 kg)  Patient Position: Sitting      The patient was seen and evaluated. No contractions or leakage of fluid. Signs and symptoms of  labor as well as labor were reviewed. The Nuchal Translucency testing was reviewed and found to be declined. A single marker MSAFP was declined for a 15-20 week gestational age window. TOP ST OH Reviewed. Dates were reviewed with the patient. An 18-22 week anatomy ultrasound has been ordered. The patient will return to the office for her next visit in 4 weeks. See antepartum flow sheet.   Hx of C/S x3  Thin Lower Uterine Segment noted on last C/S  Hx of Pre Eclampsia   Hyperthyroidism- No Meds  Subchorionic seen on dating ultrasound 21  AMA  Plans to deliver at [facility]          Assessment:  1. Yamileth Monsalve is a 28 y.o. female  2.  [id number]  3. 17w0d    Patient Active Problem List    Diagnosis Date Noted    Suspected LGA 2018    Hyperthyroidism (no meds) 2018    HRP (high risk pregnancy), third trimester 2018    Oligohydramnios 2018    RLTCS 18 M Apg 8/9 Wt 8#1 2018    At risk for impaired parent-infant bonding 2018    LGA (large for gestational age) fetus affecting mother, antepartum 2018    Suspected placental problem not found 2018    Hx of preeclampsia (G2) 2018    Encounter for supervision of normal pregnancy in multigravida in third trimester 2018    Anemia  2018 Iron supplementation started      Low implantation of placenta without hemorrhage 07/17/2018    Hx of preeclampsia, prior pregnancy, currently pregnant, second trimester 07/17/2018    Normal pregnancy in multigravida in second trimester 05/24/2018    Prior CS x 2 10/01/2014    R complex Adnexal cyst (2.74 x 1.41 x 1.52cm 10/25/2013     2.74 x 1.41 x 1.52cm          Diagnosis Orders   1. Normal pregnancy in multigravida in second trimester  Hallie Arrington MD, Maternal Fetal Medicine, [facility]   2. 16 weeks gestation of pregnancy  Hallie Arrington MD, Maternal Fetal Medicine, [facility] 86:  RTO 4 weeks                Patient was seen with total face to face time of 20 minutes. More than 50% of this visit was on counseling and education regarding her    Diagnosis Orders   1. Normal pregnancy in multigravida in second trimester  Hallie Arrington MD, Maternal Fetal Medicine, [facility]   2. 16 weeks gestation of pregnancy  Hallie Arrington MD, Maternal Fetal Medicine, [facility]    and her options. She was also counseled on her preventative health maintenance recommendations and follow-up.

## 2021-05-10 ENCOUNTER — ROUTINE PRENATAL (OUTPATIENT)
Dept: OBGYN CLINIC | Age: 36
End: 2021-05-10

## 2021-05-10 VITALS — DIASTOLIC BLOOD PRESSURE: 70 MMHG | WEIGHT: 147 LBS | BODY MASS INDEX: 25.23 KG/M2 | SYSTOLIC BLOOD PRESSURE: 112 MMHG

## 2021-05-10 DIAGNOSIS — Z3A.21 21 WEEKS GESTATION OF PREGNANCY: ICD-10-CM

## 2021-05-10 DIAGNOSIS — Z34.82 NORMAL PREGNANCY IN MULTIGRAVIDA IN SECOND TRIMESTER: Primary | ICD-10-CM

## 2021-05-10 DIAGNOSIS — E05.90 HYPERTHYROIDISM: ICD-10-CM

## 2021-05-10 PROCEDURE — 0502F SUBSEQUENT PRENATAL CARE: CPT | Performed by: OBSTETRICS & GYNECOLOGY

## 2021-05-10 NOTE — PROGRESS NOTES
Heddie Hammans is a 28 y.o. female 21w6d    [id number]    OB History    Para Term  AB Living   4 3 3 0 0 3   SAB TAB Ectopic Molar Multiple Live Births   0 0 0   0 3      # Outcome Date GA Lbr Usman/2nd Weight Sex Delivery Anes PTL Lv   4 Current            3 Term 18 38w1d  8 lb 1.5 oz (3.671 kg) M CS-LTranv   MARLEN   2 Term 02/03/15 38w0d  7 lb 12.5 oz (3.53 kg) M CS-LTranv Spinal  MARLEN   1 Term 2014 39w0d  8 lb (3.629 kg) F CS-LTranv  N MARLEN       Vitals  BP: 112/70  Weight: 147 lb (66.7 kg)  Patient Position: Sitting  Albumin: Negative  Glucose: Negative  Fetal Heart Rate: 143  Movement: Present    + fluttery fetal movement  NO VB  NO LOF  NO CTX  No complaints or issues    CALL [facility] FOR DELIVERY    Hx of C/S x3  Thin Lower Uterine Segment noted on last C/S  Hx of Pre Eclampsia   Hyperthyroidism- No Meds  Subchorionic seen on dating ultrasound 21  AMA  Plans to deliver at [address] [facility]- patient knows gender        Assessment:  1. Heddie Hammans is a 28 y.o. female  2.  [id number]  3. 21w6d    Patient Active Problem List    Diagnosis Date Noted    Suspected LGA 2018    Hyperthyroidism (no meds) 2018    HRP (high risk pregnancy), third trimester 2018    Oligohydramnios 2018    RLTCS 18 M Apg 8/9 Wt 8#1 2018    At risk for impaired parent-infant bonding 2018    LGA (large for gestational age) fetus affecting mother, antepartum 2018    Suspected placental problem not found 2018    Hx of preeclampsia (G2) 2018    Encounter for supervision of normal pregnancy in multigravida in third trimester 2018    Anemia  2018     Iron supplementation started      Low implantation of placenta without hemorrhage 2018    Hx of preeclampsia, prior pregnancy, currently pregnant, second trimester 2018    Normal pregnancy in multigravida in second trimester 2018    Prior CS x 2 10/01/2014    R complex Adnexal cyst (2.74 x 1.41 x 1.52cm 10/25/2013     2.74 x 1.41 x 1.52cm          Diagnosis Orders   1. Normal pregnancy in multigravida in second trimester     2. Hyperthyroidism (no meds)     3. 21 weeks gestation of pregnancy           Plan:  The patient will return to the office for her next visit in 4 weeks. See antepartum flow sheet.    Has anatomy US tomorrow with ADRIEL

## 2021-05-11 ENCOUNTER — ROUTINE PRENATAL (OUTPATIENT)
Dept: PERINATAL CARE | Age: 36
End: 2021-05-11
Payer: COMMERCIAL

## 2021-05-11 VITALS
BODY MASS INDEX: 24.59 KG/M2 | RESPIRATION RATE: 16 BRPM | WEIGHT: 144 LBS | SYSTOLIC BLOOD PRESSURE: 120 MMHG | HEIGHT: 64 IN | DIASTOLIC BLOOD PRESSURE: 80 MMHG | TEMPERATURE: 97.3 F | HEART RATE: 84 BPM

## 2021-05-11 DIAGNOSIS — Z36.86 SCREENING, ANTENATAL, FOR RISK OF PRE-TERM LABOR: ICD-10-CM

## 2021-05-11 DIAGNOSIS — O09.292 HISTORY OF PRE-ECLAMPSIA IN PRIOR PREGNANCY, CURRENTLY PREGNANT IN SECOND TRIMESTER: ICD-10-CM

## 2021-05-11 DIAGNOSIS — O09.522 MULTIGRAVIDA OF ADVANCED MATERNAL AGE IN SECOND TRIMESTER: Primary | ICD-10-CM

## 2021-05-11 DIAGNOSIS — O09.299 CURRENT SINGLETON PREGNANCY WITH HISTORY OF CONGENITAL ANOMALY IN PRIOR CHILD, ANTEPARTUM: ICD-10-CM

## 2021-05-11 DIAGNOSIS — O34.219 PREVIOUS CESAREAN DELIVERY, ANTEPARTUM CONDITION OR COMPLICATION: ICD-10-CM

## 2021-05-11 DIAGNOSIS — Z3A.22 22 WEEKS GESTATION OF PREGNANCY: ICD-10-CM

## 2021-05-11 DIAGNOSIS — O34.592 ABNORMALITY OF UTERUS DURING PREGNANCY IN SECOND TRIMESTER: ICD-10-CM

## 2021-05-11 LAB
ABDOMINAL CIRCUMFERENCE: NORMAL
ABDOMINAL CIRCUMFERENCE: NORMAL
BIPARIETAL DIAMETER: NORMAL
BIPARIETAL DIAMETER: NORMAL
ESTIMATED FETAL WEIGHT: NORMAL
ESTIMATED FETAL WEIGHT: NORMAL
FEMORAL DIAMETER: NORMAL
FEMORAL DIAMETER: NORMAL
HC/AC: NORMAL
HC/AC: NORMAL
HEAD CIRCUMFERENCE: NORMAL
HEAD CIRCUMFERENCE: NORMAL

## 2021-05-11 PROCEDURE — 76817 TRANSVAGINAL US OBSTETRIC: CPT | Performed by: OBSTETRICS & GYNECOLOGY

## 2021-05-11 PROCEDURE — 76811 OB US DETAILED SNGL FETUS: CPT | Performed by: OBSTETRICS & GYNECOLOGY

## 2021-06-10 ENCOUNTER — ROUTINE PRENATAL (OUTPATIENT)
Dept: OBGYN CLINIC | Age: 36
End: 2021-06-10

## 2021-06-10 VITALS — DIASTOLIC BLOOD PRESSURE: 72 MMHG | WEIGHT: 148 LBS | SYSTOLIC BLOOD PRESSURE: 118 MMHG | BODY MASS INDEX: 25.4 KG/M2

## 2021-06-10 DIAGNOSIS — Z3A.26 26 WEEKS GESTATION OF PREGNANCY: ICD-10-CM

## 2021-06-10 DIAGNOSIS — Z34.82 NORMAL PREGNANCY IN MULTIGRAVIDA IN SECOND TRIMESTER: Primary | ICD-10-CM

## 2021-06-10 PROCEDURE — 0502F SUBSEQUENT PRENATAL CARE: CPT | Performed by: NURSE PRACTITIONER

## 2021-06-10 NOTE — PROGRESS NOTES
Presents for OB visit  Gestation 26w2d  Estimated Date of Delivery: 21    CALL [facility] FOR DELIVERY    Hx of C/S x3  Thin Lower Uterine Segment noted on last C/S  Hx of Pre Eclampsia   Hyperthyroidism- No Meds  Subchorionic seen on dating ultrasound 21  AMA  Plans to deliver at [facility]- patient knows gender                     OB History    Para Term  AB Living   4 3 3 0 0 3   SAB TAB Ectopic Molar Multiple Live Births   0 0 0   0 3      # Outcome Date GA Lbr Usman/2nd Weight Sex Delivery Anes PTL Lv   4 Current            3 Term 18 38w1d  8 lb 1.5 oz (3.671 kg) M CS-LTranv   MARLEN   2 Term 02/03/15 38w0d  7 lb 12.5 oz (3.53 kg) M CS-LTranv Spinal  MARLEN   1 Term 2014 39w0d  8 lb (3.629 kg) F CS-LTranv  N MARLEN            Allergies   Allergen Reactions    Amoxicillin-Pot Clavulanate     Codeine Rash    Sulfa Antibiotics Rash     Current Outpatient Medications   Medication Sig Dispense Refill    ferrous sulfate 325 (65 Fe) MG EC tablet Take 1 tablet by mouth 2 times daily (with meals) (Patient not taking: Reported on 2021) 90 tablet 3    DiphenhydrAMINE HCl (BENADRYL ALLERGY CHILDRENS PO) Take by mouth      Pediatric Multivit-Minerals-C (RA GUMMY VITAMINS & MINERALS) CHEW Take by mouth       No current facility-administered medications for this visit.            Past Medical History:   Diagnosis Date    Hyperthyroidism     referral sent to dr. Dario Quiñonez allergies        Past Surgical History:   Procedure Laterality Date     SECTION  2014     SECTION, LOW TRANSVERSE  12/5/15    CO  DELIVERY ONLY N/A 2018     SECTION performed by Yesy Henning DO at [facility] L[facility] OR     Headaches: no  Swelling: no  Bleeding: no  Discharge: no   Dysuria: no  Nausea: no  Heartburn: no  Epigastric pain: no  Contractions: no  Leakage of fluid: no  + fetal movement       Return 4 WEEKS    Labs as indicated CBC,1 hr GTT, UA between 26-28weeks    PTL signs reviewed, if indicated  Kick Count Instructions given if indicated: The patient was instructed on fetal kick counts and was given a kick sheet to complete every 8 hours. This is to begin at 28 weeks gestation. She was instructed that the baby should move at a minimum of ten times within one hour after a meal. The patient was instructed to lay down on her left side twenty minutes after eating and count movements for up to one hour with a target value of ten movements. She was instructed to notify the office if she did not make that target after two attempts or if after any attempt there was less than four movements. After hour numbers reviewed , along with labor signs if indicated. Contractions/cramping between 5-7 minutes and persisting even with attempts of increased water and laying on side. Fluid leakage, bleeding  NST information given no    The patient was counseled on the mandatory call ahead policy. She has been instructed to call the office at anytime prior to going into the hospital so the on-call physician may direct her to the appropriate facility for care. Exceptions were reviewed including but not limited to: Decreased fetal movement, vaginal Bleeding or hemorrhage, trauma, readily expectant delivery, or any instance where she feels 911 should be utilized. Of the 20 minute duration appointment visit, Tiffany Jarvis CNP spent at least 50% of the face-to-face time in counseling, explanation of diagnosis, planning of further management, and answering all questions.

## 2021-06-10 NOTE — PATIENT INSTRUCTIONS
than normal.    Watch closely for changes in your health, and be sure to contact your doctor if you have any problems. Where can you learn more? Go to https://chpebandareweb.Abiogenix. org and sign in to your [employer] account. Enter Y612 in the Blekko box to learn more about \"Counting Your Baby's Kicks: Care Instructions. \"     If you do not have an account, please click on the \"Sign Up Now\" link. Current as of: September 5, 2018  Content Version: 12.0  © 6820-1403 [employer]. Care instructions adapted under license by [facility] ([facility]). If you have questions about a medical condition or this instruction, always ask your healthcare professional. [address] any warranty or liability for your use of this information. Preeclampsia: Care Instructions  Overview    Preeclampsia occurs when a woman's blood pressure rises during pregnancy. Often with preeclampsia, you also have swelling in your legs, hands, and face. A test may show too much protein in your urine. Preeclampsia is also called toxemia. If preeclampsia is severe and not treated, it can lead to seizures (eclampsia) and damage to your liver or kidneys. Preeclampsia can prevent your baby from getting enough food and oxygen. This can cause a low birth weight or other problems. Your doctor will watch you closely to prevent these problems. He or she also may recommend that you reduce your activity. If your preeclampsia is a danger to your health or the health of your baby, your doctor may need to deliver your baby early. While preeclampsia is a concern, most women with preeclampsia have healthy babies. After a woman gives birth, preeclampsia usually goes away on its own. But symptoms may last a few weeks or more and can get worse after delivery. Rarely, symptoms of preeclampsia don't show up until days or even weeks after childbirth. Follow-up care is a key part of your treatment and safety.  Be sure to make and go to all appointments, and call your doctor if you are having problems. It's also a good idea to know your test results and keep a list of the medicines you take. How can you care for yourself at home? · Take and record your blood pressure at home if your doctor tells you to. ? Learn the importance of the two measures of blood pressure (such as 120 over 80, or 120/80). The first number is the systolic pressure, which is the force of blood on the artery walls as the heart pumps. The second number is the diastolic pressure, which is the force of blood on the artery walls between heartbeats, when the heart is at rest. You have a choice of monitors to use. ? Manual monitor: You pump up the cuff and use a stethoscope to listen for your pulse. ? Electronic monitor: The cuff inflates, and a gauge shows your pulse rate. ? To take your blood pressure:  ? Ask your doctor to check your blood pressure monitor to be sure that it is accurate and that the cuff fits you. Also ask your doctor to watch you to make sure that you are using it right. ? You should not eat, use tobacco products, or use medicine known to raise blood pressure (such as some nasal decongestant sprays) before you take your blood pressure. ? Avoid taking your blood pressure if you have just exercised. Also avoid taking it if you are nervous or upset. Rest at least 15 minutes before you take your blood pressure. · If your doctor advises, check the protein levels in your urine. Your doctor or nurse will show you how to do this. · Take your medicines exactly as prescribed. Call your doctor if you think you are having a problem with your medicine. · Do not smoke. Quitting smoking will help improve your baby's growth and health. If you need help quitting, talk to your doctor about stop-smoking programs and medicines. These can increase your chances of quitting for good.   · Eat a balanced and healthy diet that has lots of fruits and vegetables. · If your doctor advised bed rest, be sure to stay off your feet and rest as much as possible. ? Keep a phone, phone book, notepad, and pen near the bed where you can easily reach them. ? Gently stretch your legs every hour to maintain good blood flow. ? Have another family member pack snacks and lunch food in a cooler close to your bed. ? Use this time for activities that you usually cannot find time for, such as reading, craft projects, or letter writing. · You can keep track of your baby's health by noting the length of time it takes to count 10 movements (such as kicks, flutters, or rolls). Feeling 10 movements in less than 1 hour is considered normal. Track your baby's movements once each day. Bring this record with you to each prenatal visit. When should you call for help? Call 911 anytime you think you may need emergency care. For example, call if:    · You passed out (lost consciousness). · You have a seizure. Call your doctor now or seek immediate medical care if:    · You have symptoms of preeclampsia, such as:  ? Sudden swelling of your face, hands, or feet. ? New vision problems (such as dimness or blurring). ? A severe headache. · Your blood pressure is higher than it should be, or it rises suddenly. · You have new nausea or vomiting. · You think that you are in labor. · You have pain in your belly or pelvis. Watch closely for changes in your health, and be sure to contact your doctor if:    · You gain weight rapidly. Where can you learn more? Go to https://TranspondtomasaSedia Biosciences.Solstice. org and sign in to your [employer] account. Enter G213 in the ParAccel box to learn more about \"Preeclampsia: Care Instructions. \"     If you do not have an account, please click on the \"Sign Up Now\" link. Current as of: September 5, 2018  Content Version: 12.0  © 5980-5062 Healthwise, Incorporated. Care instructions adapted under license by [facility] ([facility]).  If you have questions about a medical condition or this instruction, always ask your healthcare professional. [address] any warranty or liability for your use of this information.  Labor: Care Instructions  Your Care Instructions     labor is the start of labor between 21 and 36 weeks of pregnancy. A full-term pregnancy lasts 37 to 42 weeks. In labor, the uterus contracts to open the cervix. This is the first stage of childbirth.  labor can be caused by a problem with the baby, the mother, or both. Often the cause is not known. In some cases, doctors use medicines to try to delay labor until 29 or more weeks of pregnancy. By this time, a baby has grown enough so that problems are not likely. In some cases--such as with a serious infection--it is healthier for the baby to be born early. Your treatment will depend on how far along you are in your pregnancy and on your health and your baby's health. Follow-up care is a key part of your treatment and safety. Be sure to make and go to all appointments, and call your doctor if you are having problems. It's also a good idea to know your test results and keep a list of the medicines you take. How can you care for yourself at home? · If your doctor prescribed medicines, take them exactly as directed. Call your doctor if you think you are having a problem with your medicine. · Rest until your doctor advises you about activity. He or she will tell you if you should stay in bed most of the time. You may need to arrange for  if you have young children. · Do not have sexual intercourse unless your doctor says it is safe. · Use pads, not tampons, if you have vaginal bleeding. · Make sure to drink plenty of fluids. Dehydration can lead to contractions. If you have kidney, heart, or liver disease and have to limit fluids, talk with your doctor before you increase the amount of fluids you drink.   · Do not smoke or instructions adapted under license by [facility] ([facility]). If you have questions about a medical condition or this instruction, always ask your healthcare professional. [address] any warranty or liability for your use of this information.

## 2021-06-22 ENCOUNTER — ROUTINE PRENATAL (OUTPATIENT)
Dept: PERINATAL CARE | Age: 36
End: 2021-06-22
Payer: COMMERCIAL

## 2021-06-22 VITALS
BODY MASS INDEX: 25.92 KG/M2 | RESPIRATION RATE: 16 BRPM | TEMPERATURE: 97.1 F | DIASTOLIC BLOOD PRESSURE: 64 MMHG | WEIGHT: 151 LBS | SYSTOLIC BLOOD PRESSURE: 136 MMHG | HEART RATE: 96 BPM

## 2021-06-22 DIAGNOSIS — O09.523 MULTIGRAVIDA OF ADVANCED MATERNAL AGE IN THIRD TRIMESTER: ICD-10-CM

## 2021-06-22 DIAGNOSIS — Z36.4 ANTENATAL SCREENING FOR FETAL GROWTH RETARDATION USING ULTRASONICS: ICD-10-CM

## 2021-06-22 DIAGNOSIS — O09.299 CURRENT SINGLETON PREGNANCY WITH HISTORY OF CONGENITAL ANOMALY IN PRIOR CHILD, ANTEPARTUM: Primary | ICD-10-CM

## 2021-06-22 DIAGNOSIS — O09.293 HISTORY OF PRE-ECLAMPSIA IN PRIOR PREGNANCY, CURRENTLY PREGNANT IN THIRD TRIMESTER: ICD-10-CM

## 2021-06-22 DIAGNOSIS — O34.593 ABNORMALITY OF UTERUS DURING PREGNANCY IN THIRD TRIMESTER: ICD-10-CM

## 2021-06-22 DIAGNOSIS — O34.219 PREVIOUS CESAREAN DELIVERY, ANTEPARTUM CONDITION OR COMPLICATION: ICD-10-CM

## 2021-06-22 DIAGNOSIS — Z3A.28 28 WEEKS GESTATION OF PREGNANCY: ICD-10-CM

## 2021-06-22 DIAGNOSIS — Z13.89 ENCOUNTER FOR ROUTINE SCREENING FOR MALFORMATION USING ULTRASONICS: ICD-10-CM

## 2021-06-22 PROCEDURE — 76817 TRANSVAGINAL US OBSTETRIC: CPT | Performed by: OBSTETRICS & GYNECOLOGY

## 2021-06-22 PROCEDURE — 76819 FETAL BIOPHYS PROFIL W/O NST: CPT | Performed by: OBSTETRICS & GYNECOLOGY

## 2021-06-22 PROCEDURE — 76816 OB US FOLLOW-UP PER FETUS: CPT | Performed by: OBSTETRICS & GYNECOLOGY

## 2021-07-06 ENCOUNTER — HOSPITAL ENCOUNTER (OUTPATIENT)
Age: 36
Setting detail: SPECIMEN
Discharge: HOME | End: 2021-07-06
Payer: COMMERCIAL

## 2021-07-06 ENCOUNTER — TELEPHONE (OUTPATIENT)
Dept: OBGYN CLINIC | Age: 36
End: 2021-07-06

## 2021-07-06 ENCOUNTER — ROUTINE PRENATAL (OUTPATIENT)
Dept: OBGYN CLINIC | Age: 36
End: 2021-07-06

## 2021-07-06 VITALS — WEIGHT: 152 LBS | SYSTOLIC BLOOD PRESSURE: 128 MMHG | BODY MASS INDEX: 26.09 KG/M2 | DIASTOLIC BLOOD PRESSURE: 62 MMHG

## 2021-07-06 DIAGNOSIS — Z3A.30 30 WEEKS GESTATION OF PREGNANCY: ICD-10-CM

## 2021-07-06 DIAGNOSIS — Z34.82 NORMAL PREGNANCY IN MULTIGRAVIDA IN SECOND TRIMESTER: ICD-10-CM

## 2021-07-06 DIAGNOSIS — Z34.83 ENCOUNTER FOR SUPERVISION OF NORMAL PREGNANCY IN MULTIGRAVIDA IN THIRD TRIMESTER: Primary | ICD-10-CM

## 2021-07-06 DIAGNOSIS — Z3A.26 26 WEEKS GESTATION OF PREGNANCY: ICD-10-CM

## 2021-07-06 LAB
-: ABNORMAL
ABSOLUTE EOS #: 0.19 K/UL (ref 0–0.44)
ABSOLUTE IMMATURE GRANULOCYTE: 0.1 K/UL (ref 0–0.3)
ABSOLUTE LYMPH #: 1.64 K/UL (ref 1.1–3.7)
ABSOLUTE MONO #: 0.49 K/UL (ref 0.1–1.2)
AMORPHOUS: ABNORMAL
BACTERIA: ABNORMAL
BASOPHILS # BLD: 0 % (ref 0–2)
BASOPHILS ABSOLUTE: <0.03 K/UL (ref 0–0.2)
BILIRUBIN URINE: NEGATIVE
CASTS UA: ABNORMAL /LPF (ref 0–2)
COLOR: YELLOW
COMMENT UA: ABNORMAL
CRYSTALS, UA: ABNORMAL /HPF
DIFFERENTIAL TYPE: ABNORMAL
EOSINOPHILS RELATIVE PERCENT: 2 % (ref 1–4)
EPITHELIAL CELLS UA: ABNORMAL /HPF (ref 0–5)
GLUCOSE ADMINISTRATION: NORMAL
GLUCOSE TOLERANCE SCREEN 50G: 89 MG/DL (ref 70–135)
GLUCOSE URINE: NEGATIVE
HCT VFR BLD CALC: 35.3 % (ref 36.3–47.1)
HEMOGLOBIN: 10.9 G/DL (ref 11.9–15.1)
IMMATURE GRANULOCYTES: 1 %
KETONES, URINE: NEGATIVE
LEUKOCYTE ESTERASE, URINE: ABNORMAL
LYMPHOCYTES # BLD: 17 % (ref 24–43)
MCH RBC QN AUTO: 31.8 PG (ref 25.2–33.5)
MCHC RBC AUTO-ENTMCNC: 30.9 G/DL (ref 28.4–34.8)
MCV RBC AUTO: 102.9 FL (ref 82.6–102.9)
MONOCYTES # BLD: 5 % (ref 3–12)
MUCUS: ABNORMAL
NITRITE, URINE: NEGATIVE
NRBC AUTOMATED: 0 PER 100 WBC
OTHER OBSERVATIONS UA: ABNORMAL
PDW BLD-RTO: 12.9 % (ref 11.8–14.4)
PH UA: 7 (ref 5–8)
PLATELET # BLD: 303 K/UL (ref 138–453)
PLATELET ESTIMATE: ABNORMAL
PMV BLD AUTO: 10.1 FL (ref 8.1–13.5)
PROTEIN UA: NEGATIVE
RBC # BLD: 3.43 M/UL (ref 3.95–5.11)
RBC # BLD: ABNORMAL 10*6/UL
RBC UA: ABNORMAL /HPF (ref 0–2)
RENAL EPITHELIAL, UA: ABNORMAL /HPF
SEG NEUTROPHILS: 75 % (ref 36–65)
SEGMENTED NEUTROPHILS ABSOLUTE COUNT: 7.24 K/UL (ref 1.5–8.1)
SPECIFIC GRAVITY UA: 1.01 (ref 1–1.03)
TRICHOMONAS: ABNORMAL
TURBIDITY: CLEAR
URINE HGB: NEGATIVE
UROBILINOGEN, URINE: NORMAL
WBC # BLD: 9.7 K/UL (ref 3.5–11.3)
WBC # BLD: ABNORMAL 10*3/UL
WBC UA: ABNORMAL /HPF (ref 0–5)
YEAST: ABNORMAL

## 2021-07-06 PROCEDURE — 0502F SUBSEQUENT PRENATAL CARE: CPT | Performed by: OBSTETRICS & GYNECOLOGY

## 2021-07-06 NOTE — PROGRESS NOTES
Urine collected by lab for 1 hour GTT
Diagnosis Orders   1. Encounter for supervision of normal pregnancy in multigravida in third trimester     2. 30 weeks gestation of pregnancy               Plan:  The patient will return to the office for her next visit in 2 weeks. See antepartum flow sheet.

## 2021-07-06 NOTE — TELEPHONE ENCOUNTER
Patient was here for prenatal visit- is wanting to schedule c/section. She is due 9/14 hx of c/section x 3 with thin lower uterine segment noted on prior op note. She is asking for 9/9- naresh. Ok to schedule this date? Wasn't sure with history if you was going early?

## 2021-07-06 NOTE — TELEPHONE ENCOUNTER
We should talk about this in the office. Please schedule appt. With history of uterine dehiscence/thin lower uterine segment we may want to consider delivery closer to 37w-38w. If she does not want to do this, we just need to talk about the risks and what to look out for. This may affect what hospital she would like to deliver at. Also, will likely need an assist for this case so once we have a date I will need to ask Dr. Jared Hoffmann if it is being done at [facility]. Thank you.

## 2021-07-07 LAB
CULTURE: NORMAL
Lab: NORMAL
SPECIMEN DESCRIPTION: NORMAL

## 2021-07-19 ENCOUNTER — TELEMEDICINE (OUTPATIENT)
Dept: OBGYN CLINIC | Age: 36
End: 2021-07-19

## 2021-07-19 DIAGNOSIS — Z40.02 ENCOUNTER FOR PROPHYLACTIC SURGERY FOR RISK FACTOR RELATED TO MALIGNANT NEOPLASM OF OVARY: ICD-10-CM

## 2021-07-19 DIAGNOSIS — Z80.3 FAMILY HISTORY OF BREAST CANCER IN SISTER: ICD-10-CM

## 2021-07-19 DIAGNOSIS — Z3A.31 31 WEEKS GESTATION OF PREGNANCY: ICD-10-CM

## 2021-07-19 DIAGNOSIS — O09.93 HRP (HIGH RISK PREGNANCY), THIRD TRIMESTER: Primary | ICD-10-CM

## 2021-07-19 DIAGNOSIS — Z91.89 OTHER SPECIFIED PERSONAL RISK FACTORS, NOT ELSEWHERE CLASSIFIED: ICD-10-CM

## 2021-07-19 DIAGNOSIS — O71.03: ICD-10-CM

## 2021-07-19 PROCEDURE — 0502F SUBSEQUENT PRENATAL CARE: CPT | Performed by: OBSTETRICS & GYNECOLOGY

## 2021-07-19 NOTE — PROGRESS NOTES
Jose Pickens (:  1985) has requested an audio/video evaluation for the following concern(s):    1. HRP (high risk pregnancy), third trimester    2. 31 weeks gestation of pregnancy    3. Family history of breast cancer in sister          TELEHEALTH EVALUATION -- Audio/Visual (During VDEAP-63 public health emergency)      Jose Pickens is a 28 y.o. female 31w6d    [id number]    OB History    Para Term  AB Living   4 3 3 0 0 3   SAB TAB Ectopic Molar Multiple Live Births   0 0 0   0 3      # Outcome Date GA Lbr Usman/2nd Weight Sex Delivery Anes PTL Lv   4 Current            3 Term 18 38w1d  8 lb 1.5 oz (3.671 kg) M CS-LTranv   MARLEN   2 Term 02/03/15 38w0d  7 lb 12.5 oz (3.53 kg) M CS-LTranv Spinal  MARLEN   1 Term 2014 39w0d  8 lb (3.629 kg) F CS-LTranv  N MARLEN       Vitals         There was positive fetal movements. No contractions or leakage of fluid. Signs and symptoms of  labor as well as labor were reviewed. The S/S of Pre-Eclampsia were reviewed with the patient in detail. She is to report any of these if they occur. She currently denies any of these. Discussed delivery timing with history of uterine dehiscence. Recommend 36w-37w6d. She is declining these recommendations and would like delivery 9/3. Discussed safety measures and concern of uterine rupture and poor outcome if she goes into labor prior to c/s date. She voiced understanding. Recommend limited activity and hydration. Will schedule c/s but consider delivery in recommended window. The patient had her 28 week labs completed.     CALL [facility] FOR DELIVERY    Hx of C/S x3  Thin Lower Uterine Segment noted on last C/S  Hx of Pre Eclampsia   Hyperthyroidism- No Meds  Subchorionic seen on dating ultrasound 21  AMA  Plans to deliver at [address] [facility]- patient knows gender        T-Dap Vaccine (27-36 weeks): awaiting    The patient was instructed on fetal kick counts and was given a kick sheet to complete every 8 hours. She was instructed that the baby should move at a minimum of ten times within one hour after a meal. The patient was instructed to lay down on her left side twenty minutes after eating and count movements for up to one hour with a target value of ten movements. She was instructed to notify the office if she did not make that target after two attempts or if after any attempt there was less than four movements. The patient reports that the targets have been made Yes.  Testing:  none    Assessment:  1. Mulu Ponce is a 28 y.o. female  2. [id number]  3. 31w6d    Patient Active Problem List    Diagnosis Date Noted    Suspected LGA 2018    Hyperthyroidism (no meds) 2018    HRP (high risk pregnancy), third trimester 2018    Oligohydramnios 2018    RLTCS 18 M Apg 8/9 Wt 8#1 2018    At risk for impaired parent-infant bonding 2018    LGA (large for gestational age) fetus affecting mother, antepartum 2018    Suspected placental problem not found 2018    Hx of preeclampsia (G2) 2018    Encounter for supervision of normal pregnancy in multigravida in third trimester 2018    Anemia  2018     Iron supplementation started      Low implantation of placenta without hemorrhage 2018    Hx of preeclampsia, prior pregnancy, currently pregnant, second trimester 2018    Normal pregnancy in multigravida in second trimester 2018    Prior CS x 2 10/01/2014    R complex Adnexal cyst (2.74 x 1.41 x 1.52cm 10/25/2013     2.74 x 1.41 x 1.52cm          Diagnosis Orders   1. HRP (high risk pregnancy), third trimester  [facility] [place], [facility] pod Brdy   2. 31 weeks gestation of pregnancy  [facility]   3.  Family history of breast cancer in sister  [employer], [facility]             Plan:  The patient will return to the office for her next visit in 2 weeks. See antepartum flow sheet. Counseled on s/s of preeclampsia. Counseled on s/s of  labor. Fetal movement discussed               Testing Indicated: No  Scheduled with Nursing-Pt notified: No    Risk reduction Counseling for Primary Peritoneal/Ovarian Cancer: The patient was counseled on the risk factors that make her above the average risk for development of primary peritoneal/ovarian cancer. The patient was also counseled on the options of completing Risk Reduction Surgery with her upcoming pelvic gynecologic or abdominal surgery. The procedure with its associated risks, benefits, and alternatives were reviewed at length. Per the the recommendations from the Society of Gynecologic Oncology and the [address] [place] [address] of Obstetricians and Gynecologists, the patient had the procedure reviewed and was informed of the potential benefits of such a procedure. The primary benefit is a greater than 50% reduction in the future risk of development Primary Peritoneal/Ovarian cancer. She was informed that large scale studies have not shown an increase in the estimated blood loss, complications, or operating time. The patient was made aware that bleeding, infection, and injury to nearby organs are potential complications with this additional surgery. The patient was also informed and had this reviewed in detail with her that once the risk reduction procedure is completed she will have lost the fertility opportunity, to conceive naturally, going forward and that any future conception would require reproductive assisted approaches; (IVF, GIFT,ZIFT)-all described in lay terms to the patient. The patient was counseled and understands that the loss of fertility can not be reversed. She voiced understanding of this and signed a written consent.  She was also counseled that any future pregnancy (18- cases annually), carries an added increase risk of ectopic pregnancy and the potential need for future surgery. The patient still wishes to proceed with the risk reduction surgery. The patient had explained to her that the completion of the procedure does not guarantee her that she will not be diagnosed with a future primary peritoneal or ovarian malignancy but her risks are greatly reduced. The patient had the procedure discussed with her at length and in detail. The risks and benefits of concurrent ovarian cancer risk reducing bilateral salpingectomy with the patient's upcoming scheduled abdominal or pelvic surgery. This patient is at above average risk for development of ovarian cancer. See risk factors below:      1. Yes    2. No  Age greater than 60 yo   3. No  Elevated BMI  4. No  Exposure to hormone therapy after menopause   5. Yes  A family history of Ovarian, Breast, Uterine or Colorectal cancer  6. No  Having a familial cancer syndrome (HBOC)  7. No  Having a positive genetic mutation predisposing the patient to Ovarian cancer risk elevation  8. No  History of in vitro/fertility treatments  9. No  Smoking  10. No  Medically indicated with Endometrial Ablation  11. No  Other personal risk factors not elsewhere specified  12. Yes  Other: History of uterine dehiscence, family history breast cancer, referral placed with genetic counseling. I advised the patient that the primary benefit of such surgery is up to a 65% reduction in future risk of ovarian cancer. Finally, the patient has been thoroughly counseled regarding the consequence of loss of fertility following this procedure. The patient understands that this loss of fertility cannot be reversed and has expressed via verbal and written consent that her wishes are to proceed with this surgery for the purposes of reducing risk for ovarian cancer. Bryan Stakc is a 28 y.o. female female was evaluated by a Virtual Visit (video visit) encounter to address concerns as mentioned above.   A caregiver was present when appropriate. Due to this being a TeleHealth encounter ([facility]), evaluation of the following organ systems was limited: Vitals/Urine Dip/Fetal Heart Tones/Constitutional/EENT/Resp/CV/GI//MS/Neuro/Skin/Heme-Lymph-Imm. Pursuant to the emergency declaration under the [facility], [address] [place] [address] and the [employer] Act, this Virtual Visit was conducted with patient's (and/or legal guardian's) consent, to reduce the patient's risk of exposure to COVID-19 and provide necessary medical care. The patient (and/or legal guardian) has also been advised to contact this office for worsening conditions or problems, and seek emergency medical treatment and/or call 911 if deemed necessary. Services were provided through a video synchronous discussion virtually to substitute for in-person clinic visit. Patient and provider were located at their individual homes. Electronically signed by Gabriella Centeno DO on 7/19/21 at 1:09 PM EDT     An electronic signature was used to authenticate this note. The total Virtual Visit time of 20 minutes. More than 50% of this visit was on counseling and education regarding her    Diagnosis Orders   1. HRP (high risk pregnancy), third trimester  [address]   2. 31 weeks gestation of pregnancy  [facility] markus Andrews   3. Family history of breast cancer in sister  [facility] Marie, [facility] markus Andrews    and her options. She was also counseled on her preventative health maintenance recommendations and follow-up.

## 2021-07-29 ENCOUNTER — INITIAL CONSULT (OUTPATIENT)
Dept: ONCOLOGY | Age: 36
End: 2021-07-29
Payer: COMMERCIAL

## 2021-07-29 ENCOUNTER — HOSPITAL ENCOUNTER (OUTPATIENT)
Facility: MEDICAL CENTER | Age: 36
End: 2021-07-29

## 2021-07-29 DIAGNOSIS — Z91.89 AT HIGH RISK FOR BREAST CANCER: ICD-10-CM

## 2021-07-29 DIAGNOSIS — Z80.41 FAMILY HISTORY OF OVARIAN CANCER: ICD-10-CM

## 2021-07-29 DIAGNOSIS — Z80.3 FAMILY HISTORY OF BREAST CANCER: Primary | ICD-10-CM

## 2021-07-29 DIAGNOSIS — Z80.0 FAMILY HISTORY OF COLON CANCER: ICD-10-CM

## 2021-07-29 PROCEDURE — 96040 PR GENETIC COUNSELING, EACH 30 MIN: CPT | Performed by: GENETIC COUNSELOR, MS

## 2021-07-29 NOTE — PROGRESS NOTES
[facility]   Hereditary Cancer Risk Assessment      Name: Catracho Loredo   YOB: 1985   Date of Consultation: 7/29/21      Ms. Riccardo Atkinson was seen at the [address] [place] [address] for genetic counseling on 7/29/21. Ms. Riccardo Atkinson was referred by Esvin Marr DO due to her family history of cancer. PERSONAL HISTORY   Ms. Riccardo Atkinson is a 28 y.o.  female with no personal history of cancer. Ms. Riccardo Atkinson is currently 35 weeks pregnant and will be having a planned caesarian delivery on 9/3/21. She is pursuing risk reducing bilateral salpingectomy due to her strong family history of cancer. She relates personal concern regarding both breast and ovarian cancer. FAMILY HISTORY  Ms. Riccardo Atkinson has 2 sons and 1 daughter and is currently pregnant with a daughter. She has one maternal half sister (55y). Ms. Joann Rodriguez mother is living at age 67 with a history of breast cancer at age 46 treated with lumpectomy and radiation therapy. Her mother has not undergone genetic testing. Ms. Riccardo Atkinson has two maternal aunts and two maternal uncles. She has just two maternal first cousins which are both men in their 45s. Ms. Joann Rodriguez maternal grandmother passed away young from hepatitis. Her grandmother had one sister (Ms. Joann Rodriguez great aunt) who had breast cancer in her 46s and one brother (Ms. Joann Rodriguez great uncle) who had colon cancer. Ms. Joann Rodriguez father is living at age 67, no cancer. She reports that her paternal grandmother passed away in her 62s from \"abdominal cancer\" which is suspicious for ovarian cancer. Ms. Riccardo Atkinson reports [place] ancestry and denies any known Ashkenazi Sabianist heritage. RISK ASSESSMENT   We discussed that approximately 5-10% of cancers are due to a hereditary gene mutation which causes an increased risk for certain cancers. Hereditary cancers are typically diagnosed at younger ages (under age 46y) and occur in multiple generations of a family. Multiple individuals with the same type of cancer (example: breast or colorectal) or uncommon cancers (example: ovarian, pancreatic, male breast cancer) are also features of hereditary cancers. We discussed that Ms. Méndez's family history is somewhat concerning for a hereditary predisposition. She has two maternal relatives with breast cancer in their 46s. It is important to note that Ms. Harrison Knutson has few female relatives in her maternal family which may limit our risk assessment for additional female related cancers. She also has a close paternal relative with suspected ovarian cancer in her 62s. In summary, Ms. Harrison Knutson meets the [address] [place] [address] (NCCN) guidelines for genetic testing of the BRCA1/2 genes based on having a paternal second degree relative (grandmother) with cancer suspicious for ovarian cancer. She also has a limited maternal family structure with two close relatives with breast cancer. The NCCN guidelines also recognize that an individual's personal and/or family history may be explained by more than one inherited cancer syndrome. Thus, a multi-gene panel may be more efficient, more cost effecting, and increases the yield of detecting a hereditary mutation which would impact medical management. Given her personal and/or family history, we recommend testing for the following genes at minimum: JULIO CÉSAR, CHEK2, NBN, and PALB2. DISCUSSION  We discussed that the BRCA1/2 genes are the most common genes associated with hereditary breast and ovarian cancer. We also discussed that genetic testing is available for multiple other genes related to hereditary cancer. Some of these genes are known to carry a significant increased risk for several cancers including colon, breast, uterine, ovarian, stomach, and pancreatic cancer, while some of these genes are believed to have a moderate increased risk for breast and other cancers.  We discussed the possibility of finding a mutation in Riccardo Atkinson understands that salpingectomy alone is currently not standard of care for risk reduction in this situation. However, given her strong concerns about ovarian cancer, she would still desire salpingectomy at the time of her delivery with delayed oophorectomy if needed. 4) Ms. Riccardo Atkinson declines to proceed with genetic testing at this time. While she is concerned about her risk for ovarian cancer and would like to be as proactive as possible in decreasing this risk, she would like to defer her decision regarding genetic testing until after her delivery due to personal reasons. 5) Since Ms. Riccardo Atkinson will not be pursing genetic testing at this time, I did make recommendations regarding future breast cancer screening given her family history. Her estimated lifetime risk for breast cancer is 22.7% according to the [employer] risk model. At this level of risk, the NCCN recommends that she consider an annual breast MRI staggered 6 months apart from annual mammograms beginning at age 36. She understands that future genetic test results may impact these recommendations. Her risk should also be re-calculated when she begins routine surveillance to adjust for any changes in risk factors. 6) We encourage Ms. Riccardo Atkinson to contact her genetic counselor at [phone] at any time with additional questions or if she would like to proceed with genetic testing. A total of 40 minutes were spent face to face with Ms. Riccardo Atkinson and 50% of the time was spent educating and counseling. The [address] [place] [address] National Program would be glad to offer our assistance should you have any questions or concerns about this information. Please feel free to contact us at [phone]. Javier Hassan.  Alireza Johnson MS, [facility]   Licensed Genetic Counselor

## 2021-08-03 ENCOUNTER — ROUTINE PRENATAL (OUTPATIENT)
Dept: PERINATAL CARE | Age: 36
End: 2021-08-03
Payer: COMMERCIAL

## 2021-08-03 VITALS
BODY MASS INDEX: 26.63 KG/M2 | WEIGHT: 156 LBS | RESPIRATION RATE: 16 BRPM | TEMPERATURE: 97.2 F | DIASTOLIC BLOOD PRESSURE: 62 MMHG | HEIGHT: 64 IN | SYSTOLIC BLOOD PRESSURE: 112 MMHG | HEART RATE: 88 BPM

## 2021-08-03 DIAGNOSIS — O09.299 CURRENT SINGLETON PREGNANCY WITH HISTORY OF CONGENITAL ANOMALY IN PRIOR CHILD, ANTEPARTUM: ICD-10-CM

## 2021-08-03 DIAGNOSIS — Z13.89 ENCOUNTER FOR ROUTINE SCREENING FOR MALFORMATION USING ULTRASONICS: ICD-10-CM

## 2021-08-03 DIAGNOSIS — Z36.4 ANTENATAL SCREENING FOR FETAL GROWTH RETARDATION USING ULTRASONICS: ICD-10-CM

## 2021-08-03 DIAGNOSIS — Z3A.34 34 WEEKS GESTATION OF PREGNANCY: ICD-10-CM

## 2021-08-03 DIAGNOSIS — O09.293 HISTORY OF PRE-ECLAMPSIA IN PRIOR PREGNANCY, CURRENTLY PREGNANT IN THIRD TRIMESTER: ICD-10-CM

## 2021-08-03 DIAGNOSIS — O34.593 ABNORMALITY OF UTERUS DURING PREGNANCY IN THIRD TRIMESTER: Primary | ICD-10-CM

## 2021-08-03 DIAGNOSIS — O34.219 PREVIOUS CESAREAN DELIVERY, ANTEPARTUM CONDITION OR COMPLICATION: ICD-10-CM

## 2021-08-03 DIAGNOSIS — O09.523 MULTIGRAVIDA OF ADVANCED MATERNAL AGE IN THIRD TRIMESTER: ICD-10-CM

## 2021-08-03 PROCEDURE — 76819 FETAL BIOPHYS PROFIL W/O NST: CPT | Performed by: OBSTETRICS & GYNECOLOGY

## 2021-08-03 PROCEDURE — 76817 TRANSVAGINAL US OBSTETRIC: CPT | Performed by: OBSTETRICS & GYNECOLOGY

## 2021-08-03 PROCEDURE — 76805 OB US >/= 14 WKS SNGL FETUS: CPT | Performed by: OBSTETRICS & GYNECOLOGY

## 2021-08-04 ENCOUNTER — ROUTINE PRENATAL (OUTPATIENT)
Dept: OBGYN CLINIC | Age: 36
End: 2021-08-04

## 2021-08-04 VITALS — BODY MASS INDEX: 26.61 KG/M2 | SYSTOLIC BLOOD PRESSURE: 110 MMHG | DIASTOLIC BLOOD PRESSURE: 60 MMHG | WEIGHT: 155 LBS

## 2021-08-04 DIAGNOSIS — Z34.93 NORMAL PREGNANCY IN THIRD TRIMESTER: Primary | ICD-10-CM

## 2021-08-04 DIAGNOSIS — Z80.3 FAMILY HISTORY OF BREAST CANCER IN SISTER: ICD-10-CM

## 2021-08-04 DIAGNOSIS — O71.03: ICD-10-CM

## 2021-08-04 DIAGNOSIS — O34.219 PREVIOUS CESAREAN DELIVERY, ANTEPARTUM CONDITION OR COMPLICATION: ICD-10-CM

## 2021-08-04 DIAGNOSIS — O09.93 HRP (HIGH RISK PREGNANCY), THIRD TRIMESTER: ICD-10-CM

## 2021-08-04 DIAGNOSIS — Z40.02 ENCOUNTER FOR PROPHYLACTIC SURGERY FOR RISK FACTOR RELATED TO MALIGNANT NEOPLASM OF OVARY: ICD-10-CM

## 2021-08-04 DIAGNOSIS — Z3A.34 34 WEEKS GESTATION OF PREGNANCY: ICD-10-CM

## 2021-08-04 PROCEDURE — 0502F SUBSEQUENT PRENATAL CARE: CPT | Performed by: OBSTETRICS & GYNECOLOGY

## 2021-08-04 NOTE — PROGRESS NOTES
Bailey Valle is a 28 y.o. female 34w1d        OB History    Para Term  AB Living   4 3 3 0 0 3   SAB TAB Ectopic Molar Multiple Live Births   0 0 0   0 3      # Outcome Date GA Lbr Usman/2nd Weight Sex Delivery Anes PTL Lv   4 Current            3 Term 18 38w1d  8 lb 1.5 oz (3.671 kg) M CS-LTranv   MARLEN   2 Term 02/03/15 38w0d  7 lb 12.5 oz (3.53 kg) M CS-LTranv Spinal  MARLEN   1 Term 2014 39w0d  8 lb (3.629 kg) F CS-LTranv  N MARLEN       Vitals  BP: 110/60  Weight: 155 lb (70.3 kg)  Patient Position: Sitting  Albumin: Negative  Glucose: Negative      The patient was seen and evaluated. There was positive fetal movements. No contractions or leakage of fluid. Signs and symptoms of  labor as well as labor were reviewed. The S/S of Pre-Eclampsia were reviewed with the patient in detail. She is to report any of these if they occur. She currently denies any of these. The patient had her 28 week labs completed. Hospital Outpatient Visit on 2021   Component Date Value Ref Range Status    Color, UA 2021 YELLOW  YELLOW Final    Turbidity UA 2021 CLEAR  CLEAR Final    Glucose, Ur 2021 NEGATIVE  NEGATIVE Final    Bilirubin Urine 2021 NEGATIVE  NEGATIVE Final    Ketones, Urine 2021 NEGATIVE  NEGATIVE Final    Specific Gravity, UA 2021 1.006  1.005 - 1.030 Final    Urine Hgb 2021 NEGATIVE  NEGATIVE Final    pH, UA 2021 7.0  5.0 - 8.0 Final    Protein, UA 2021 NEGATIVE  NEGATIVE Final    Urobilinogen, Urine 2021 Normal  Normal Final    Nitrite, Urine 2021 NEGATIVE  NEGATIVE Final    Leukocyte Esterase, Urine 2021 TRACE* NEGATIVE Final    Urinalysis Comments 2021 NOT REPORTED   Final    Specimen Description 2021 . CLEAN CATCH URINE   Final    Special Requests 2021 NOT REPORTED   Final    Culture 2021 NO SIGNIFICANT GROWTH   Final    WBC 2021 9.7  3.5 - 11.3 k/uL Final    RBC 07/06/2021 3.43* 3.95 - 5.11 m/uL Final    Hemoglobin 07/06/2021 10.9* 11.9 - 15.1 g/dL Final    Hematocrit 07/06/2021 35.3* 36.3 - 47.1 % Final    MCV 07/06/2021 102.9  82.6 - 102.9 fL Final    MCH 07/06/2021 31.8  25.2 - 33.5 pg Final    MCHC 07/06/2021 30.9  28.4 - 34.8 g/dL Final    RDW 07/06/2021 12.9  11.8 - 14.4 % Final    Platelets 10/58/8254 303  138 - 453 k/uL Final    MPV 07/06/2021 10.1  8.1 - 13.5 fL Final    NRBC Automated 07/06/2021 0.0  0.0 per 100 WBC Final    Differential Type 07/06/2021 NOT REPORTED   Final    Seg Neutrophils 07/06/2021 75* 36 - 65 % Final    Lymphocytes 07/06/2021 17* 24 - 43 % Final    Monocytes 07/06/2021 5  3 - 12 % Final    Eosinophils % 07/06/2021 2  1 - 4 % Final    Basophils 07/06/2021 0  0 - 2 % Final    Immature Granulocytes 07/06/2021 1* 0 % Final    Segs Absolute 07/06/2021 7.24  1.50 - 8.10 k/uL Final    Absolute Lymph # 07/06/2021 1.64  1.10 - 3.70 k/uL Final    Absolute Mono # 07/06/2021 0.49  0.10 - 1.20 k/uL Final    Absolute Eos # 07/06/2021 0.19  0.00 - 0.44 k/uL Final    Basophils Absolute 07/06/2021 <0.03  0.00 - 0.20 k/uL Final    Absolute Immature Granulocyte 07/06/2021 0.10  0.00 - 0.30 k/uL Final    WBC Morphology 07/06/2021 NOT REPORTED   Final    RBC Morphology 07/06/2021 NOT REPORTED   Final    Platelet Estimate 19/38/8220 NOT REPORTED   Final    GLU ADMN 07/06/2021 Glucola   Final    Glucose tolerance screen 50g 07/06/2021 89  70 - 135 mg/dL Final    - 07/06/2021        Final    WBC, UA 07/06/2021 0 TO 2  0 - 5 /HPF Final    RBC, UA 07/06/2021 None  0 - 2 /HPF Final    Casts UA 07/06/2021 NOT REPORTED  0 - 2 /LPF Final    Crystals, UA 07/06/2021 NOT REPORTED  None /HPF Final    Epithelial Cells UA 07/06/2021 50   0 - 5 /HPF Final    Renal Epithelial, UA 07/06/2021 NOT REPORTED  0 /HPF Final    Bacteria, UA 07/06/2021 FEW* None Final    Mucus, UA 07/06/2021 NOT REPORTED  None Final    Trichomonas, UA 2021 NOT REPORTED  None Final    Amorphous, UA 2021 NOT REPORTED  None Final    Other Observations UA 2021 NOT REPORTED  NOT REQ. Final    Yeast, UA 2021 NOT REPORTED  None Final   ]    CALL JUAN DANIEL FOR DELIVERY  9/3/21 RLS 8 AM DR FRANCES- RRBS PENDING- as of 21    Hx of C/S x3  Thin Lower Uterine Segment noted on last C/S  Hx of Pre Eclampsia   Hyperthyroidism- No Meds  Subchorionic seen on dating ultrasound 21  AMA  Plans to deliver at [address] [facility]- patient knows gender        T-Dap Vaccine (27-36 weeks): awaiting    The patient was instructed on fetal kick counts and was given a kick sheet to complete every 8 hours. She was instructed that the baby should move at a minimum of ten times within one hour after a meal. The patient was instructed to lay down on her left side twenty minutes after eating and count movements for up to one hour with a target value of ten movements. She was instructed to notify the office if she did not make that target after two attempts or if after any attempt there was less than four movements. The patient reports that the targets have been made Yes.  Testing:  none    Assessment:  1. Abril Davila is a 28 y.o. female  2. [id number]  3. 34w1d    Patient Active Problem List    Diagnosis Date Noted    Dehiscence of old uterine scar with extension before onset of labor, antepartum, third trimester 2021    Family history of breast cancer in sister 2021    Other specified personal risk factors, not elsewhere classified 2021     Discussed at length the risks and benefits of concurrent ovarian cancer risk reducing bilateral salpingectomy with patient's upcoming scheduled abdominal or pelvic surgery as this patient is at average risk for development of ovarian cancer. Advised patient that the primary benefit of such surgery is up to a 65% reduction in future risk of ovarian cancer.  Finally, patient has been thoroughly counseled regarding the consequence of the loss of fertility following this procedure. Patient understands that this loss of fertility cannot be reversed and has expressed via verbal and written consent that her wishes are to proceed with this surgery for the purposes of reducing risk for ovarian cancer.  Encounter for prophylactic surgery for risk factor related to malignant neoplasm of ovary 07/19/2021     Discussed at length the risks and benefits of concurrent ovarian cancer risk reducing bilateral salpingectomy with patient's upcoming scheduled abdominal or pelvic surgery as this patient is at average risk for development of ovarian cancer. Advised patient that the primary benefit of such surgery is up to a 65% reduction in future risk of ovarian cancer. Finally, patient has been thoroughly counseled regarding the consequence of the loss of fertility following this procedure. Patient understands that this loss of fertility cannot be reversed and has expressed via verbal and written consent that her wishes are to proceed with this surgery for the purposes of reducing risk for ovarian cancer.        Suspected LGA 11/16/2018    Hyperthyroidism (no meds) 11/16/2018    HRP (high risk pregnancy), third trimester 11/16/2018    Oligohydramnios 11/16/2018    RLTCS 11/16/18 M Apg 8/9 Wt 8#1 11/16/2018    At risk for impaired parent-infant bonding 11/16/2018    LGA (large for gestational age) fetus affecting mother, antepartum 11/05/2018    Suspected placental problem not found 11/05/2018    Hx of preeclampsia (G2) 09/20/2018    Encounter for supervision of normal pregnancy in multigravida in third trimester 09/20/2018    Anemia  09/11/2018     Iron supplementation started      Low implantation of placenta without hemorrhage 07/17/2018    Hx of preeclampsia, prior pregnancy, currently pregnant, second trimester 07/17/2018    Normal pregnancy in multigravida in second trimester 05/24/2018    Prior CS x 2 10/01/2014    R complex Adnexal cyst (2.74 x 1.41 x 1.52cm 10/25/2013     2.74 x 1.41 x 1.52cm          Diagnosis Orders   1. Normal pregnancy in third trimester     2. Prior CS x 2     3. 34 weeks gestation of pregnancy     4. HRP (high risk pregnancy), third trimester     5. Family history of breast cancer in sister     10. Dehiscence of old uterine scar with extension before onset of labor, antepartum, third trimester     7. Encounter for prophylactic surgery for risk factor related to malignant neoplasm of ovary               Plan:  The patient will return to the office for her next visit in 2 weeks. See antepartum flow sheet. Counseled on s/s of preeclampsia. Counseled on s/s of  labor. Fetal movement discussed  History of thin lower uterine segment/dehiscense and recommend delivery 36-37w6d. Pt. Electing for 9/3/21. She understands risk. Understands recommendation for limited activity and s/s of labor.  Testing Indicated: No  Scheduled with Nursing-Pt notified: No      Patient was seen with total face to face time of 20 minutes. More than 50% of this visit was on counseling and education regarding her    Diagnosis Orders   1. Normal pregnancy in third trimester     2. Prior CS x 2     3. 34 weeks gestation of pregnancy     4. HRP (high risk pregnancy), third trimester     5. Family history of breast cancer in sister     10. Dehiscence of old uterine scar with extension before onset of labor, antepartum, third trimester     7. Encounter for prophylactic surgery for risk factor related to malignant neoplasm of ovary      and her options. She was also counseled on her preventative health maintenance recommendations and follow-up.

## 2021-08-17 ENCOUNTER — HOSPITAL ENCOUNTER (OUTPATIENT)
Age: 36
Setting detail: SPECIMEN
Discharge: HOME | End: 2021-08-17
Payer: COMMERCIAL

## 2021-08-17 ENCOUNTER — ROUTINE PRENATAL (OUTPATIENT)
Dept: OBGYN CLINIC | Age: 36
End: 2021-08-17

## 2021-08-17 VITALS — DIASTOLIC BLOOD PRESSURE: 62 MMHG | WEIGHT: 159 LBS | SYSTOLIC BLOOD PRESSURE: 112 MMHG | BODY MASS INDEX: 27.29 KG/M2

## 2021-08-17 DIAGNOSIS — O34.219 PREVIOUS CESAREAN DELIVERY, ANTEPARTUM CONDITION OR COMPLICATION: ICD-10-CM

## 2021-08-17 DIAGNOSIS — O09.93 HRP (HIGH RISK PREGNANCY), THIRD TRIMESTER: Primary | ICD-10-CM

## 2021-08-17 DIAGNOSIS — Z3A.36 36 WEEKS GESTATION OF PREGNANCY: ICD-10-CM

## 2021-08-17 DIAGNOSIS — O71.03: ICD-10-CM

## 2021-08-17 PROCEDURE — 99213 OFFICE O/P EST LOW 20 MIN: CPT | Performed by: NURSE PRACTITIONER

## 2021-08-17 NOTE — PATIENT INSTRUCTIONS
than normal.    Watch closely for changes in your health, and be sure to contact your doctor if you have any problems. Where can you learn more? Go to https://chpepiceweb.[email or web address] org and sign in to your Accella Learning account. Enter M007 in the STWA box to learn more about \"Counting Your Baby's Kicks: Care Instructions. \"     If you do not have an account, please click on the \"Sign Up Now\" link. Current as of: September 5, 2018  Content Version: 12.0  © 4076-8138 [employer]Bridge. Care instructions adapted under license by [facility] ([facility]). If you have questions about a medical condition or this instruction, always ask your healthcare professional. [address] any warranty or liability for your use of this information. Preeclampsia: Care Instructions  Overview    Preeclampsia occurs when a woman's blood pressure rises during pregnancy. Often with preeclampsia, you also have swelling in your legs, hands, and face. A test may show too much protein in your urine. Preeclampsia is also called toxemia. If preeclampsia is severe and not treated, it can lead to seizures (eclampsia) and damage to your liver or kidneys. Preeclampsia can prevent your baby from getting enough food and oxygen. This can cause a low birth weight or other problems. Your doctor will watch you closely to prevent these problems. He or she also may recommend that you reduce your activity. If your preeclampsia is a danger to your health or the health of your baby, your doctor may need to deliver your baby early. While preeclampsia is a concern, most women with preeclampsia have healthy babies. After a woman gives birth, preeclampsia usually goes away on its own. But symptoms may last a few weeks or more and can get worse after delivery. Rarely, symptoms of preeclampsia don't show up until days or even weeks after childbirth. Follow-up care is a key part of your treatment and safety.  Be sure to make and go to all appointments, and call your doctor if you are having problems. It's also a good idea to know your test results and keep a list of the medicines you take. How can you care for yourself at home? · Take and record your blood pressure at home if your doctor tells you to. ? Learn the importance of the two measures of blood pressure (such as 120 over 80, or 120/80). The first number is the systolic pressure, which is the force of blood on the artery walls as the heart pumps. The second number is the diastolic pressure, which is the force of blood on the artery walls between heartbeats, when the heart is at rest. You have a choice of monitors to use. ? Manual monitor: You pump up the cuff and use a stethoscope to listen for your pulse. ? Electronic monitor: The cuff inflates, and a gauge shows your pulse rate. ? To take your blood pressure:  ? Ask your doctor to check your blood pressure monitor to be sure that it is accurate and that the cuff fits you. Also ask your doctor to watch you to make sure that you are using it right. ? You should not eat, use tobacco products, or use medicine known to raise blood pressure (such as some nasal decongestant sprays) before you take your blood pressure. ? Avoid taking your blood pressure if you have just exercised. Also avoid taking it if you are nervous or upset. Rest at least 15 minutes before you take your blood pressure. · If your doctor advises, check the protein levels in your urine. Your doctor or nurse will show you how to do this. · Take your medicines exactly as prescribed. Call your doctor if you think you are having a problem with your medicine. · Do not smoke. Quitting smoking will help improve your baby's growth and health. If you need help quitting, talk to your doctor about stop-smoking programs and medicines. These can increase your chances of quitting for good.   · Eat a balanced and healthy diet that has lots of fruits and vegetables. · If your doctor advised bed rest, be sure to stay off your feet and rest as much as possible. ? Keep a phone, phone book, notepad, and pen near the bed where you can easily reach them. ? Gently stretch your legs every hour to maintain good blood flow. ? Have another family member pack snacks and lunch food in a cooler close to your bed. ? Use this time for activities that you usually cannot find time for, such as reading, craft projects, or letter writing. · You can keep track of your baby's health by noting the length of time it takes to count 10 movements (such as kicks, flutters, or rolls). Feeling 10 movements in less than 1 hour is considered normal. Track your baby's movements once each day. Bring this record with you to each prenatal visit. When should you call for help? Call 911 anytime you think you may need emergency care. For example, call if:    · You passed out (lost consciousness). · You have a seizure. Call your doctor now or seek immediate medical care if:    · You have symptoms of preeclampsia, such as:  ? Sudden swelling of your face, hands, or feet. ? New vision problems (such as dimness or blurring). ? A severe headache. · Your blood pressure is higher than it should be, or it rises suddenly. · You have new nausea or vomiting. · You think that you are in labor. · You have pain in your belly or pelvis. Watch closely for changes in your health, and be sure to contact your doctor if:    · You gain weight rapidly. Where can you learn more? Go to https://MBA PolymerstomasaSlurp.co.uk.ReadyDock. org and sign in to your Topix account. Enter G294 in the MEEP box to learn more about \"Preeclampsia: Care Instructions. \"     If you do not have an account, please click on the \"Sign Up Now\" link. Current as of: September 5, 2018  Content Version: 12.0  © 2489-1124 Healthwise, Incorporated. Care instructions adapted under license by [facility] ([facility]).  If you have questions about a medical condition or this instruction, always ask your healthcare professional. [address] any warranty or liability for your use of this information.  Labor: Care Instructions  Your Care Instructions     labor is the start of labor between 21 and 36 weeks of pregnancy. A full-term pregnancy lasts 37 to 42 weeks. In labor, the uterus contracts to open the cervix. This is the first stage of childbirth.  labor can be caused by a problem with the baby, the mother, or both. Often the cause is not known. In some cases, doctors use medicines to try to delay labor until 29 or more weeks of pregnancy. By this time, a baby has grown enough so that problems are not likely. In some cases--such as with a serious infection--it is healthier for the baby to be born early. Your treatment will depend on how far along you are in your pregnancy and on your health and your baby's health. Follow-up care is a key part of your treatment and safety. Be sure to make and go to all appointments, and call your doctor if you are having problems. It's also a good idea to know your test results and keep a list of the medicines you take. How can you care for yourself at home? · If your doctor prescribed medicines, take them exactly as directed. Call your doctor if you think you are having a problem with your medicine. · Rest until your doctor advises you about activity. He or she will tell you if you should stay in bed most of the time. You may need to arrange for  if you have young children. · Do not have sexual intercourse unless your doctor says it is safe. · Use pads, not tampons, if you have vaginal bleeding. · Make sure to drink plenty of fluids. Dehydration can lead to contractions. If you have kidney, heart, or liver disease and have to limit fluids, talk with your doctor before you increase the amount of fluids you drink.   · Do not smoke or allow others to smoke around you. If you need help quitting, talk to your doctor about stop-smoking programs and medicines. These can increase your chances of quitting for good. When should you call for help? Call 911 anytime you think you may need emergency care. For example, call if:    · You passed out (lost consciousness). · You have severe vaginal bleeding. · You have severe pain in your belly or pelvis. · You have had fluid gushing or leaking from your vagina and you know or think the umbilical cord is bulging into your vagina. If this happens, immediately get down on your knees so your rear end (buttocks) is higher than your head. This will decrease the pressure on the cord until help arrives. Call your doctor now or seek immediate medical care if:    · You have signs of preeclampsia, such as:  ? Sudden swelling of your face, hands, or feet. ? New vision problems (such as dimness or blurring). ? A severe headache. · You have any vaginal bleeding. · You have belly pain or cramping. · You have a fever. · You have had regular contractions (with or without pain) for an hour. This means that you have 6 or more within 1 hour after you change your position and drink fluids. · You have a sudden release of fluid from the vagina. · You have low back pain or pelvic pressure that does not go away. · You notice that your baby has stopped moving or is moving much less than normal.    Watch closely for changes in your health, and be sure to contact your doctor if you have any problems. Where can you learn more? Go to https://YopimatomasaVena Solutions.Kriyari. org and sign in to your Avocadoâ„¢ account. Enter Q400 in the Graph Story box to learn more about \" Labor: Care Instructions. \"     If you do not have an account, please click on the \"Sign Up Now\" link. Current as of: 2018  Content Version: 12.0  © 7284-4195 Healthwise, [facility].  Care instructions adapted under license by [facility] ([facility]). If you have questions about a medical condition or this instruction, always ask your healthcare professional. [address] any warranty or liability for your use of this information.

## 2021-08-17 NOTE — PROGRESS NOTES
Presents for OB visit  Gestation 36w0d  Estimated Date of Delivery: 21    CALL JUAN DANIEL FOR DELIVERY  9/3/21 RLTCS 8 AM DR FRANCES- RRBS PENDING- as of 21    Hx of C/S x3  Thin Lower Uterine Segment noted on last C/S  Hx of Pre Eclampsia   Hyperthyroidism- No Meds  Subchorionic seen on dating ultrasound 21  AMA  Plans to deliver at [facility]- patient knows gender                 OB History    Para Term  AB Living   4 3 3 0 0 3   SAB TAB Ectopic Molar Multiple Live Births   0 0 0   0 3      # Outcome Date GA Lbr Usman/2nd Weight Sex Delivery Anes PTL Lv   4 Current            3 Term 18 38w1d  8 lb 1.5 oz (3.671 kg) M CS-LTranv   MARLEN   2 Term 02/03/15 38w0d  7 lb 12.5 oz (3.53 kg) M CS-LTranv Spinal  MARLEN   1 Term 2014 39w0d  8 lb (3.629 kg) F CS-LTranv  N MARLEN            Allergies   Allergen Reactions    Amoxicillin-Pot Clavulanate     Codeine Rash    Sulfa Antibiotics Rash     Current Outpatient Medications   Medication Sig Dispense Refill    DiphenhydrAMINE HCl (BENADRYL ALLERGY CHILDRENS PO) Take by mouth      Pediatric Multivit-Minerals-C (RA GUMMY VITAMINS & MINERALS) CHEW Take by mouth       No current facility-administered medications for this visit. Past Medical History:   Diagnosis Date    Hyperthyroidism     referral sent to dr. Ag Romero allergies        Past Surgical History:   Procedure Laterality Date     SECTION  2014     SECTION, LOW TRANSVERSE  12/5/15    ME  DELIVERY ONLY N/A 2018     SECTION performed by Jacey Antony DO at [facility]     Headaches: no  Swelling: no  Bleeding: no  Discharge: no   Dysuria: no  Nausea: no  Heartburn: no  Epigastric pain: no  Contractions: no  Leakage of fluid: no  + fetal movement       Return 1 WEEK    Labs as indicated GBS    PTL signs reviewed, if indicated  Kick Count Instructions given if indicated:   The patient was instructed on fetal kick counts and was given a kick sheet to complete every 8 hours. This is to begin at 28 weeks gestation. She was instructed that the baby should move at a minimum of ten times within one hour after a meal. The patient was instructed to lay down on her left side twenty minutes after eating and count movements for up to one hour with a target value of ten movements. She was instructed to notify the office if she did not make that target after two attempts or if after any attempt there was less than four movements. After hour numbers reviewed , along with labor signs if indicated. Contractions/cramping between 5-7 minutes and persisting even with attempts of increased water and laying on side. Fluid leakage, bleeding  NST information given no    The patient was counseled on the mandatory call ahead policy. She has been instructed to call the office at anytime prior to going into the hospital so the on-call physician may direct her to the appropriate facility for care. Exceptions were reviewed including but not limited to: Decreased fetal movement, vaginal Bleeding or hemorrhage, trauma, readily expectant delivery, or any instance where she feels 911 should be utilized. Of the 20 minute duration appointment visit, Will Casper CNP spent at least 50% of the face-to-face time in counseling, explanation of diagnosis, planning of further management, and answering all questions.

## 2021-08-20 LAB
CULTURE: NORMAL
Lab: NORMAL
SPECIMEN DESCRIPTION: NORMAL

## 2021-08-24 ENCOUNTER — ROUTINE PRENATAL (OUTPATIENT)
Dept: OBGYN CLINIC | Age: 36
End: 2021-08-24

## 2021-08-24 VITALS — DIASTOLIC BLOOD PRESSURE: 62 MMHG | WEIGHT: 159 LBS | SYSTOLIC BLOOD PRESSURE: 110 MMHG | BODY MASS INDEX: 27.29 KG/M2

## 2021-08-24 DIAGNOSIS — O09.93 HRP (HIGH RISK PREGNANCY), THIRD TRIMESTER: Primary | ICD-10-CM

## 2021-08-24 DIAGNOSIS — Z3A.37 37 WEEKS GESTATION OF PREGNANCY: ICD-10-CM

## 2021-08-24 PROCEDURE — 0502F SUBSEQUENT PRENATAL CARE: CPT | Performed by: NURSE PRACTITIONER

## 2021-08-24 NOTE — PATIENT INSTRUCTIONS

## 2021-08-24 NOTE — PROGRESS NOTES
Presents for OB visit  Gestation 37w0d  Estimated Date of Delivery: 21    CALL JUAN DANIEL FOR DELIVERY  9/3/21 [facility] 8 AM DR FRANCES- rrbs approved 21 see media section     Hx of C/S x3  Thin Lower Uterine Segment noted on last C/S  Hx of Pre Eclampsia   Hyperthyroidism- No Meds  Subchorionic seen on dating ultrasound 21  AMA  Plans to deliver at [facility]- patient knows gender  GBS negative             OB History    Para Term  AB Living   4 3 3 0 0 3   SAB TAB Ectopic Molar Multiple Live Births   0 0 0   0 3      # Outcome Date GA Lbr Usman/2nd Weight Sex Delivery Anes PTL Lv   4 Current            3 Term 18 38w1d  8 lb 1.5 oz (3.671 kg) M CS-LTranv   MARLEN   2 Term 02/03/15 38w0d  7 lb 12.5 oz (3.53 kg) M CS-LTranv Spinal  MARLEN   1 Term 2014 39w0d  8 lb (3.629 kg) F CS-LTranv  N MARLEN            Allergies   Allergen Reactions    Amoxicillin-Pot Clavulanate     Codeine Rash    Sulfa Antibiotics Rash     Current Outpatient Medications   Medication Sig Dispense Refill    DiphenhydrAMINE HCl (BENADRYL ALLERGY CHILDRENS PO) Take by mouth      Pediatric Multivit-Minerals-C (RA GUMMY VITAMINS & MINERALS) CHEW Take by mouth       No current facility-administered medications for this visit. Past Medical History:   Diagnosis Date    Hyperthyroidism     referral sent to dr. Bailee Torres allergies        Past Surgical History:   Procedure Laterality Date     SECTION  2014     SECTION, LOW TRANSVERSE  12/5/15    HI  DELIVERY ONLY N/A 2018     SECTION performed by Maxine Chase DO at [facility] L&D OR     Headaches: no  Swelling: no  Bleeding: no  Discharge: no   Dysuria: no  Nausea: no  Heartburn: no  Epigastric pain: no  Contractions: no  Leakage of fluid: no  + fetal movement       Return 1 WEEK    Labs as indicated n/a    PTL signs reviewed, if indicated  Kick Count Instructions given if indicated:   The patient was instructed on fetal kick counts and was given a kick sheet to complete every 8 hours. This is to begin at 28 weeks gestation. She was instructed that the baby should move at a minimum of ten times within one hour after a meal. The patient was instructed to lay down on her left side twenty minutes after eating and count movements for up to one hour with a target value of ten movements. She was instructed to notify the office if she did not make that target after two attempts or if after any attempt there was less than four movements. After hour numbers reviewed , along with labor signs if indicated. Contractions/cramping between 5-7 minutes and persisting even with attempts of increased water and laying on side. Fluid leakage, bleeding  NST information given no    The patient was counseled on the mandatory call ahead policy. She has been instructed to call the office at anytime prior to going into the hospital so the on-call physician may direct her to the appropriate facility for care. Exceptions were reviewed including but not limited to: Decreased fetal movement, vaginal Bleeding or hemorrhage, trauma, readily expectant delivery, or any instance where she feels 911 should be utilized. Of the 20 minute duration appointment visit, Sylvain Otoole CNP spent at least 50% of the face-to-face time in counseling, explanation of diagnosis, planning of further management, and answering all questions.

## 2021-08-31 ENCOUNTER — ROUTINE PRENATAL (OUTPATIENT)
Dept: OBGYN CLINIC | Age: 36
End: 2021-08-31

## 2021-08-31 VITALS — BODY MASS INDEX: 27.46 KG/M2 | WEIGHT: 160 LBS | SYSTOLIC BLOOD PRESSURE: 120 MMHG | DIASTOLIC BLOOD PRESSURE: 70 MMHG

## 2021-08-31 DIAGNOSIS — Z40.02 ENCOUNTER FOR PROPHYLACTIC SURGERY FOR RISK FACTOR RELATED TO MALIGNANT NEOPLASM OF OVARY: ICD-10-CM

## 2021-08-31 DIAGNOSIS — O09.93 HRP (HIGH RISK PREGNANCY), THIRD TRIMESTER: Primary | ICD-10-CM

## 2021-08-31 DIAGNOSIS — O71.03: ICD-10-CM

## 2021-08-31 DIAGNOSIS — O34.219 PREVIOUS CESAREAN DELIVERY, ANTEPARTUM CONDITION OR COMPLICATION: ICD-10-CM

## 2021-08-31 DIAGNOSIS — Z3A.38 38 WEEKS GESTATION OF PREGNANCY: ICD-10-CM

## 2021-08-31 PROCEDURE — 0502F SUBSEQUENT PRENATAL CARE: CPT | Performed by: OBSTETRICS & GYNECOLOGY

## 2021-08-31 NOTE — PROGRESS NOTES
Garry Reid is a 28 y.o. female 38w0d        OB History    Para Term  AB Living   4 3 3 0 0 3   SAB TAB Ectopic Molar Multiple Live Births   0 0 0   0 3      # Outcome Date GA Lbr Usman/2nd Weight Sex Delivery Anes PTL Lv   4 Current            3 Term 18 38w1d  8 lb 1.5 oz (3.671 kg) M CS-LTranv   MARLEN   2 Term 02/03/15 38w0d  7 lb 12.5 oz (3.53 kg) M CS-LTranv Spinal  MARLEN   1 Term 2014 39w0d  8 lb (3.629 kg) F CS-LTranv  N MARLEN       Vitals  BP: 120/70  Weight: 160 lb (72.6 kg)  Patient Position: Sitting  Albumin: Negative  Glucose: Negative      The patient was seen and evaluated. There was positive fetal movements. No contractions or leakage of fluid. Signs and symptoms of labor were reviewed. The S/S of Pre-Eclampsia were reviewed with the patient in detail. She is to report any of these if they occur. She currently denies any of these. The patient was instructed on fetal kick counts and was given a kick sheet to complete every 8 hours. She was instructed that the baby should move at a minimum of ten times within one hour after a meal. The patient was instructed to lay down on her left side twenty minutes after eating and count movements for up to one hour with a target value of ten movements. She was instructed to notify the office if she did not make that target after two attempts or if after any attempt there was less than four movements. The patient reports that the targets have been made Yes. CALL JUAN DANIEL FOR DELIVERY  [facility] 21 see media section     Hx of C/S x3  Thin Lower Uterine Segment noted on last C/S  Hx of Pre Eclampsia   Hyperthyroidism- No Meds  Subchorionic seen on dating ultrasound 21  AMA  Plans to deliver at [facility]- patient knows gender  GBS negative      T-Dap Vaccine Completed (27-36 weeks): No    Allergies:   Allergies as of 2021 - Fully Reviewed 2021   Allergen Reaction Noted  Amoxicillin-pot clavulanate  2013    Codeine Rash 2015    Sulfa antibiotics Rash 2013         Group Beta Strep collection was completed. Yes  GBS Results:   Hospital Outpatient Visit on 2021   Component Date Value Ref Range Status    Specimen Description 2021 . VAGINAL SPECIMEN   Final    Special Requests 2021 NOT REPORTED   Final    Culture 2021 NEGATIVE FOR GROUP B STREPTOCOCCI   Final   ]        Cervical Exam was:   Pt declined      The patient was counseled on the mandatory call ahead policy. She has been instructed to call the office at anytime prior to going into the hospital so the on-call physician may direct her to the appropriate facility for care. Exceptions were reviewed including but not limited to: Decreased fetal movement, vaginal Bleeding or hemorrhage, trauma, readily expectant delivery, or any instance where she feels 911 should be utilized. The patient was counseled on Labor & Delivery. Route of delivery and counseling on vaginal, operative vaginal, and  sections were completed with the risks of each to both the patient as well as her baby. The possibility of a blood transfusion was discussed as well. The patient was not opposed to receiving a transfusion if needed. The patient was counseled on types of analgesia during labor and is considering either Regional or IV medication the risks, benefits and alternatives were discussed.  Testing:  none        Assessment:  1. Chema Craig is a 28 y.o. female  2.  [id number]  3. 38w0d    Patient Active Problem List    Diagnosis Date Noted    Dehiscence of old uterine scar with extension before onset of labor, antepartum, third trimester 2021    Family history of breast cancer in sister 2021    Other specified personal risk factors, not elsewhere classified 2021     Overview Note:     Discussed at length the risks and benefits of concurrent ovarian cancer risk reducing bilateral salpingectomy with patient's upcoming scheduled abdominal or pelvic surgery as this patient is at average risk for development of ovarian cancer. Advised patient that the primary benefit of such surgery is up to a 65% reduction in future risk of ovarian cancer. Finally, patient has been thoroughly counseled regarding the consequence of the loss of fertility following this procedure. Patient understands that this loss of fertility cannot be reversed and has expressed via verbal and written consent that her wishes are to proceed with this surgery for the purposes of reducing risk for ovarian cancer.  Encounter for prophylactic surgery for risk factor related to malignant neoplasm of ovary 07/19/2021     Overview Note:     Discussed at length the risks and benefits of concurrent ovarian cancer risk reducing bilateral salpingectomy with patient's upcoming scheduled abdominal or pelvic surgery as this patient is at average risk for development of ovarian cancer. Advised patient that the primary benefit of such surgery is up to a 65% reduction in future risk of ovarian cancer. Finally, patient has been thoroughly counseled regarding the consequence of the loss of fertility following this procedure. Patient understands that this loss of fertility cannot be reversed and has expressed via verbal and written consent that her wishes are to proceed with this surgery for the purposes of reducing risk for ovarian cancer.        Suspected LGA 11/16/2018    Hyperthyroidism (no meds) 11/16/2018    HRP (high risk pregnancy), third trimester 11/16/2018    Oligohydramnios 11/16/2018    RLTCS 11/16/18 M Apg 8/9 Wt 8#1 11/16/2018    At risk for impaired parent-infant bonding 11/16/2018    LGA (large for gestational age) fetus affecting mother, antepartum 11/05/2018    Suspected placental problem not found 11/05/2018    Hx of preeclampsia (G2) 09/20/2018    Encounter for supervision of normal pregnancy in multigravida in third trimester 09/20/2018    Anemia  09/11/2018     Overview Note:     Iron supplementation started      Low implantation of placenta without hemorrhage 07/17/2018    Hx of preeclampsia, prior pregnancy, currently pregnant, second trimester 07/17/2018    Normal pregnancy in multigravida in second trimester 05/24/2018    Prior CS x 2 10/01/2014    R complex Adnexal cyst (2.74 x 1.41 x 1.52cm 10/25/2013     Overview Note:     2.74 x 1.41 x 1.52cm          Diagnosis Orders   1. HRP (high risk pregnancy), third trimester     2. 38 weeks gestation of pregnancy     3. Prior CS x 3     4. Dehiscence of old uterine scar with extension before onset of labor, antepartum, third trimester     5. Encounter for prophylactic surgery for risk factor related to malignant neoplasm of ovary             Plan:  The patient will return to the office for her next visit in 2 weeks. See antepartum flow sheet. Scheduled for repeat C/S w/ rrBS  Counseled on s/s of labor. Counseled on s/s of preeclampsia. Fetal movement discussed in detail. Patient was seen with total face to face time of 20 minutes. More than 50% of this visit was on counseling and education regarding her    Diagnosis Orders   1. HRP (high risk pregnancy), third trimester     2. 38 weeks gestation of pregnancy     3. Prior CS x 3     4. Dehiscence of old uterine scar with extension before onset of labor, antepartum, third trimester     5. Encounter for prophylactic surgery for risk factor related to malignant neoplasm of ovary      and her options. She was also counseled on her preventative health maintenance recommendations and follow-up.

## 2021-09-02 ENCOUNTER — ANESTHESIA EVENT (OUTPATIENT)
Dept: LABOR AND DELIVERY | Age: 36
End: 2021-09-02
Payer: COMMERCIAL

## 2021-09-03 ENCOUNTER — ANESTHESIA (OUTPATIENT)
Dept: LABOR AND DELIVERY | Age: 36
End: 2021-09-03
Payer: COMMERCIAL

## 2021-09-03 ENCOUNTER — HOSPITAL ENCOUNTER (INPATIENT)
Age: 36
LOS: 1 days | Discharge: HOME | End: 2021-09-04
Attending: OBSTETRICS & GYNECOLOGY | Admitting: OBSTETRICS & GYNECOLOGY
Payer: COMMERCIAL

## 2021-09-03 VITALS — OXYGEN SATURATION: 100 % | DIASTOLIC BLOOD PRESSURE: 63 MMHG | SYSTOLIC BLOOD PRESSURE: 115 MMHG | TEMPERATURE: 97.5 F

## 2021-09-03 PROBLEM — Z34.83 ENCOUNTER FOR SUPERVISION OF NORMAL PREGNANCY IN MULTIGRAVIDA IN THIRD TRIMESTER: Status: RESOLVED | Noted: 2018-09-20 | Resolved: 2021-09-03

## 2021-09-03 PROBLEM — O36.60X0 LGA (LARGE FOR GESTATIONAL AGE) FETUS AFFECTING MANAGEMENT OF MOTHER: Status: RESOLVED | Noted: 2018-11-16 | Resolved: 2021-09-03

## 2021-09-03 PROBLEM — Z34.82 NORMAL PREGNANCY IN MULTIGRAVIDA IN SECOND TRIMESTER: Status: RESOLVED | Noted: 2018-05-24 | Resolved: 2021-09-03

## 2021-09-03 PROBLEM — Z03.72 SUSPECTED PLACENTAL PROBLEM NOT FOUND: Status: RESOLVED | Noted: 2018-11-05 | Resolved: 2021-09-03

## 2021-09-03 PROBLEM — O99.013 ANEMIA DURING PREGNANCY IN THIRD TRIMESTER: Status: RESOLVED | Noted: 2018-09-11 | Resolved: 2021-09-03

## 2021-09-03 PROBLEM — O09.90 HIGH RISK PREGNANCY, ANTEPARTUM: Status: ACTIVE | Noted: 2021-09-03

## 2021-09-03 PROBLEM — Z91.89 AT RISK FOR IMPAIRED PARENT-INFANT BONDING: Status: RESOLVED | Noted: 2018-11-16 | Resolved: 2021-09-03

## 2021-09-03 PROBLEM — O44.40 LOW IMPLANTATION OF PLACENTA WITHOUT HEMORRHAGE: Status: RESOLVED | Noted: 2018-07-17 | Resolved: 2021-09-03

## 2021-09-03 PROBLEM — O36.60X0 LGA (LARGE FOR GESTATIONAL AGE) FETUS AFFECTING MOTHER, ANTEPARTUM: Status: RESOLVED | Noted: 2018-11-05 | Resolved: 2021-09-03

## 2021-09-03 PROBLEM — O09.93 HRP (HIGH RISK PREGNANCY), THIRD TRIMESTER: Status: RESOLVED | Noted: 2018-11-16 | Resolved: 2021-09-03

## 2021-09-03 PROBLEM — O41.00X0 OLIGOHYDRAMNIOS: Status: RESOLVED | Noted: 2018-11-16 | Resolved: 2021-09-03

## 2021-09-03 LAB
ABO/RH: NORMAL
ABSOLUTE EOS #: 0.1 K/UL (ref 0–0.4)
ABSOLUTE IMMATURE GRANULOCYTE: ABNORMAL K/UL (ref 0–0.3)
ABSOLUTE LYMPH #: 1.4 K/UL (ref 1–4.8)
ABSOLUTE MONO #: 0.5 K/UL (ref 0.1–1.3)
AMPHETAMINE SCREEN URINE: NEGATIVE
ANTIBODY SCREEN: NEGATIVE
ARM BAND NUMBER: NORMAL
BARBITURATE SCREEN URINE: NEGATIVE
BASOPHILS # BLD: 1 % (ref 0–2)
BASOPHILS ABSOLUTE: 0 K/UL (ref 0–0.2)
BENZODIAZEPINE SCREEN, URINE: NEGATIVE
BUPRENORPHINE URINE: NORMAL
CANNABINOID SCREEN URINE: NEGATIVE
COCAINE METABOLITE, URINE: NEGATIVE
DIFFERENTIAL TYPE: ABNORMAL
EOSINOPHILS RELATIVE PERCENT: 2 % (ref 0–4)
EXPIRATION DATE: NORMAL
HCT VFR BLD CALC: 30.5 % (ref 36–46)
HEMOGLOBIN: 10.1 G/DL (ref 12–16)
IMMATURE GRANULOCYTES: ABNORMAL %
LYMPHOCYTES # BLD: 20 % (ref 24–44)
MCH RBC QN AUTO: 30.2 PG (ref 26–34)
MCHC RBC AUTO-ENTMCNC: 33.1 G/DL (ref 31–37)
MCV RBC AUTO: 91.5 FL (ref 80–100)
MDMA URINE: NORMAL
METHADONE SCREEN, URINE: NEGATIVE
METHAMPHETAMINE, URINE: NORMAL
MONOCYTES # BLD: 7 % (ref 1–7)
NRBC AUTOMATED: ABNORMAL PER 100 WBC
OPIATES, URINE: NEGATIVE
OXYCODONE SCREEN URINE: NEGATIVE
PDW BLD-RTO: 13.7 % (ref 11.5–14.9)
PHENCYCLIDINE, URINE: NEGATIVE
PLATELET # BLD: 286 K/UL (ref 150–450)
PLATELET ESTIMATE: ABNORMAL
PMV BLD AUTO: 8.4 FL (ref 6–12)
PROPOXYPHENE, URINE: NORMAL
RBC # BLD: 3.34 M/UL (ref 4–5.2)
RBC # BLD: ABNORMAL 10*6/UL
SEG NEUTROPHILS: 70 % (ref 36–66)
SEGMENTED NEUTROPHILS ABSOLUTE COUNT: 5 K/UL (ref 1.3–9.1)
T. PALLIDUM, IGG: NONREACTIVE
TEST INFORMATION: NORMAL
TRICYCLIC ANTIDEPRESSANTS, UR: NORMAL
WBC # BLD: 7 K/UL (ref 3.5–11)
WBC # BLD: ABNORMAL 10*3/UL

## 2021-09-03 PROCEDURE — 1220000000 HC SEMI PRIVATE OB R&B

## 2021-09-03 PROCEDURE — 88307 TISSUE EXAM BY PATHOLOGIST: CPT

## 2021-09-03 PROCEDURE — 2580000003 HC RX 258: Performed by: ANESTHESIOLOGY

## 2021-09-03 PROCEDURE — 6370000000 HC RX 637 (ALT 250 FOR IP): Performed by: ADVANCED PRACTICE MIDWIFE

## 2021-09-03 PROCEDURE — 6360000002 HC RX W HCPCS: Performed by: NURSE ANESTHETIST, CERTIFIED REGISTERED

## 2021-09-03 PROCEDURE — 3700000000 HC ANESTHESIA ATTENDED CARE: Performed by: OBSTETRICS & GYNECOLOGY

## 2021-09-03 PROCEDURE — 6360000002 HC RX W HCPCS: Performed by: ADVANCED PRACTICE MIDWIFE

## 2021-09-03 PROCEDURE — 2580000003 HC RX 258: Performed by: NURSE ANESTHETIST, CERTIFIED REGISTERED

## 2021-09-03 PROCEDURE — 7100000000 HC PACU RECOVERY - FIRST 15 MIN: Performed by: OBSTETRICS & GYNECOLOGY

## 2021-09-03 PROCEDURE — 6370000000 HC RX 637 (ALT 250 FOR IP): Performed by: OBSTETRICS & GYNECOLOGY

## 2021-09-03 PROCEDURE — 3609079900 HC CESAREAN SECTION: Performed by: OBSTETRICS & GYNECOLOGY

## 2021-09-03 PROCEDURE — 88305 TISSUE EXAM BY PATHOLOGIST: CPT

## 2021-09-03 PROCEDURE — 7100000001 HC PACU RECOVERY - ADDTL 15 MIN: Performed by: OBSTETRICS & GYNECOLOGY

## 2021-09-03 PROCEDURE — 2709999900 HC NON-CHARGEABLE SUPPLY: Performed by: OBSTETRICS & GYNECOLOGY

## 2021-09-03 PROCEDURE — 36415 COLL VENOUS BLD VENIPUNCTURE: CPT

## 2021-09-03 PROCEDURE — 3700000001 HC ADD 15 MINUTES (ANESTHESIA): Performed by: OBSTETRICS & GYNECOLOGY

## 2021-09-03 PROCEDURE — 86780 TREPONEMA PALLIDUM: CPT

## 2021-09-03 PROCEDURE — 2580000003 HC RX 258: Performed by: ADVANCED PRACTICE MIDWIFE

## 2021-09-03 PROCEDURE — 6360000002 HC RX W HCPCS

## 2021-09-03 PROCEDURE — 85025 COMPLETE CBC W/AUTO DIFF WBC: CPT

## 2021-09-03 PROCEDURE — 86901 BLOOD TYPING SEROLOGIC RH(D): CPT

## 2021-09-03 PROCEDURE — 80307 DRUG TEST PRSMV CHEM ANLYZR: CPT

## 2021-09-03 PROCEDURE — 86850 RBC ANTIBODY SCREEN: CPT

## 2021-09-03 PROCEDURE — 86900 BLOOD TYPING SEROLOGIC ABO: CPT

## 2021-09-03 RX ORDER — ONDANSETRON 2 MG/ML
4 INJECTION INTRAMUSCULAR; INTRAVENOUS EVERY 6 HOURS PRN
Status: DISCONTINUED | OUTPATIENT
Start: 2021-09-03 | End: 2021-09-04 | Stop reason: HOSPADM

## 2021-09-03 RX ORDER — OXYCODONE HYDROCHLORIDE AND ACETAMINOPHEN 5; 325 MG/1; MG/1
2 TABLET ORAL PRN
Status: DISCONTINUED | OUTPATIENT
Start: 2021-09-03 | End: 2021-09-03

## 2021-09-03 RX ORDER — LABETALOL HYDROCHLORIDE 5 MG/ML
5 INJECTION, SOLUTION INTRAVENOUS EVERY 10 MIN PRN
Status: DISCONTINUED | OUTPATIENT
Start: 2021-09-03 | End: 2021-09-03

## 2021-09-03 RX ORDER — ACETAMINOPHEN 500 MG
1000 TABLET ORAL EVERY 6 HOURS PRN
Status: DISCONTINUED | OUTPATIENT
Start: 2021-09-04 | End: 2021-09-03

## 2021-09-03 RX ORDER — SODIUM CHLORIDE, SODIUM LACTATE, POTASSIUM CHLORIDE, CALCIUM CHLORIDE 600; 310; 30; 20 MG/100ML; MG/100ML; MG/100ML; MG/100ML
INJECTION, SOLUTION INTRAVENOUS CONTINUOUS
Status: DISCONTINUED | OUTPATIENT
Start: 2021-09-03 | End: 2021-09-03

## 2021-09-03 RX ORDER — SODIUM CHLORIDE 0.9 % (FLUSH) 0.9 %
10 SYRINGE (ML) INJECTION PRN
Status: DISCONTINUED | OUTPATIENT
Start: 2021-09-03 | End: 2021-09-03

## 2021-09-03 RX ORDER — SODIUM CHLORIDE 0.9 % (FLUSH) 0.9 %
10 SYRINGE (ML) INJECTION PRN
Status: DISCONTINUED | OUTPATIENT
Start: 2021-09-03 | End: 2021-09-04 | Stop reason: HOSPADM

## 2021-09-03 RX ORDER — SODIUM CHLORIDE 9 MG/ML
25 INJECTION, SOLUTION INTRAVENOUS PRN
Status: DISCONTINUED | OUTPATIENT
Start: 2021-09-03 | End: 2021-09-03

## 2021-09-03 RX ORDER — OXYCODONE HYDROCHLORIDE AND ACETAMINOPHEN 5; 325 MG/1; MG/1
2 TABLET ORAL EVERY 4 HOURS PRN
Status: DISCONTINUED | OUTPATIENT
Start: 2021-09-03 | End: 2021-09-04 | Stop reason: HOSPADM

## 2021-09-03 RX ORDER — ONDANSETRON 2 MG/ML
INJECTION INTRAMUSCULAR; INTRAVENOUS PRN
Status: DISCONTINUED | OUTPATIENT
Start: 2021-09-03 | End: 2021-09-03 | Stop reason: SDUPTHER

## 2021-09-03 RX ORDER — ACETAMINOPHEN 500 MG
1000 TABLET ORAL EVERY 6 HOURS PRN
Status: DISCONTINUED | OUTPATIENT
Start: 2021-09-03 | End: 2021-09-04 | Stop reason: HOSPADM

## 2021-09-03 RX ORDER — SWAB
1 SWAB, NON-MEDICATED MISCELLANEOUS DAILY
Status: DISCONTINUED | OUTPATIENT
Start: 2021-09-03 | End: 2021-09-04 | Stop reason: HOSPADM

## 2021-09-03 RX ORDER — SODIUM CHLORIDE 0.9 % (FLUSH) 0.9 %
5-40 SYRINGE (ML) INJECTION PRN
Status: DISCONTINUED | OUTPATIENT
Start: 2021-09-03 | End: 2021-09-03

## 2021-09-03 RX ORDER — LANOLIN 100 %
OINTMENT (GRAM) TOPICAL
Status: DISCONTINUED | OUTPATIENT
Start: 2021-09-03 | End: 2021-09-04 | Stop reason: HOSPADM

## 2021-09-03 RX ORDER — SODIUM CHLORIDE, SODIUM LACTATE, POTASSIUM CHLORIDE, CALCIUM CHLORIDE 600; 310; 30; 20 MG/100ML; MG/100ML; MG/100ML; MG/100ML
INJECTION, SOLUTION INTRAVENOUS CONTINUOUS
Status: DISCONTINUED | OUTPATIENT
Start: 2021-09-03 | End: 2021-09-04 | Stop reason: HOSPADM

## 2021-09-03 RX ORDER — OXYCODONE HYDROCHLORIDE AND ACETAMINOPHEN 5; 325 MG/1; MG/1
1 TABLET ORAL EVERY 4 HOURS PRN
Status: DISCONTINUED | OUTPATIENT
Start: 2021-09-04 | End: 2021-09-03

## 2021-09-03 RX ORDER — NALBUPHINE HCL 10 MG/ML
10 AMPUL (ML) INJECTION ONCE
Status: DISCONTINUED | OUTPATIENT
Start: 2021-09-03 | End: 2021-09-03

## 2021-09-03 RX ORDER — ONDANSETRON 2 MG/ML
4 INJECTION INTRAMUSCULAR; INTRAVENOUS EVERY 6 HOURS PRN
Status: DISCONTINUED | OUTPATIENT
Start: 2021-09-03 | End: 2021-09-03

## 2021-09-03 RX ORDER — DIPHENHYDRAMINE HYDROCHLORIDE 50 MG/ML
INJECTION INTRAMUSCULAR; INTRAVENOUS PRN
Status: DISCONTINUED | OUTPATIENT
Start: 2021-09-03 | End: 2021-09-03 | Stop reason: SDUPTHER

## 2021-09-03 RX ORDER — KETOROLAC TROMETHAMINE 30 MG/ML
30 INJECTION, SOLUTION INTRAMUSCULAR; INTRAVENOUS EVERY 6 HOURS
Status: COMPLETED | OUTPATIENT
Start: 2021-09-03 | End: 2021-09-03

## 2021-09-03 RX ORDER — ONDANSETRON 2 MG/ML
4 INJECTION INTRAMUSCULAR; INTRAVENOUS
Status: DISCONTINUED | OUTPATIENT
Start: 2021-09-03 | End: 2021-09-03

## 2021-09-03 RX ORDER — SODIUM CHLORIDE, SODIUM LACTATE, POTASSIUM CHLORIDE, AND CALCIUM CHLORIDE .6; .31; .03; .02 G/100ML; G/100ML; G/100ML; G/100ML
1000 INJECTION, SOLUTION INTRAVENOUS ONCE
Status: DISCONTINUED | OUTPATIENT
Start: 2021-09-03 | End: 2021-09-03

## 2021-09-03 RX ORDER — LIDOCAINE HYDROCHLORIDE 10 MG/ML
1 INJECTION, SOLUTION EPIDURAL; INFILTRATION; INTRACAUDAL; PERINEURAL
Status: DISCONTINUED | OUTPATIENT
Start: 2021-09-03 | End: 2021-09-03

## 2021-09-03 RX ORDER — SODIUM CHLORIDE 0.9 % (FLUSH) 0.9 %
5-40 SYRINGE (ML) INJECTION EVERY 12 HOURS SCHEDULED
Status: DISCONTINUED | OUTPATIENT
Start: 2021-09-03 | End: 2021-09-03

## 2021-09-03 RX ORDER — OXYCODONE HYDROCHLORIDE AND ACETAMINOPHEN 5; 325 MG/1; MG/1
1 TABLET ORAL EVERY 6 HOURS PRN
Qty: 20 TABLET | Refills: 0 | Status: SHIPPED | OUTPATIENT
Start: 2021-09-03 | End: 2021-09-08

## 2021-09-03 RX ORDER — SENNA AND DOCUSATE SODIUM 50; 8.6 MG/1; MG/1
2 TABLET, FILM COATED ORAL 2 TIMES DAILY
Status: DISCONTINUED | OUTPATIENT
Start: 2021-09-03 | End: 2021-09-04 | Stop reason: HOSPADM

## 2021-09-03 RX ORDER — TRISODIUM CITRATE DIHYDRATE AND CITRIC ACID MONOHYDRATE 500; 334 MG/5ML; MG/5ML
30 SOLUTION ORAL ONCE
Status: COMPLETED | OUTPATIENT
Start: 2021-09-03 | End: 2021-09-03

## 2021-09-03 RX ORDER — SODIUM CHLORIDE 9 MG/ML
INJECTION, SOLUTION INTRAVENOUS CONTINUOUS PRN
Status: DISCONTINUED | OUTPATIENT
Start: 2021-09-03 | End: 2021-09-03 | Stop reason: SDUPTHER

## 2021-09-03 RX ORDER — DIPHENHYDRAMINE HYDROCHLORIDE 50 MG/ML
25 INJECTION INTRAMUSCULAR; INTRAVENOUS EVERY 6 HOURS PRN
Status: DISCONTINUED | OUTPATIENT
Start: 2021-09-03 | End: 2021-09-04

## 2021-09-03 RX ORDER — OXYCODONE HYDROCHLORIDE AND ACETAMINOPHEN 5; 325 MG/1; MG/1
2 TABLET ORAL EVERY 4 HOURS PRN
Status: DISCONTINUED | OUTPATIENT
Start: 2021-09-04 | End: 2021-09-03

## 2021-09-03 RX ORDER — SENNA AND DOCUSATE SODIUM 50; 8.6 MG/1; MG/1
2 TABLET, FILM COATED ORAL 2 TIMES DAILY
Qty: 60 TABLET | Refills: 0 | Status: SHIPPED | OUTPATIENT
Start: 2021-09-03

## 2021-09-03 RX ORDER — OXYCODONE HYDROCHLORIDE AND ACETAMINOPHEN 5; 325 MG/1; MG/1
1 TABLET ORAL PRN
Status: DISCONTINUED | OUTPATIENT
Start: 2021-09-03 | End: 2021-09-03

## 2021-09-03 RX ORDER — SCOLOPAMINE TRANSDERMAL SYSTEM 1 MG/1
1 PATCH, EXTENDED RELEASE TRANSDERMAL ONCE
Status: DISCONTINUED | OUTPATIENT
Start: 2021-09-03 | End: 2021-09-04 | Stop reason: HOSPADM

## 2021-09-03 RX ORDER — DIPHENHYDRAMINE HYDROCHLORIDE 50 MG/ML
12.5 INJECTION INTRAMUSCULAR; INTRAVENOUS
Status: DISCONTINUED | OUTPATIENT
Start: 2021-09-03 | End: 2021-09-03

## 2021-09-03 RX ORDER — IBUPROFEN 600 MG/1
600 TABLET ORAL EVERY 6 HOURS
Status: DISCONTINUED | OUTPATIENT
Start: 2021-09-04 | End: 2021-09-04 | Stop reason: HOSPADM

## 2021-09-03 RX ORDER — BISACODYL 10 MG
10 SUPPOSITORY, RECTAL RECTAL DAILY PRN
Status: DISCONTINUED | OUTPATIENT
Start: 2021-09-03 | End: 2021-09-04 | Stop reason: HOSPADM

## 2021-09-03 RX ORDER — IBUPROFEN 600 MG/1
600 TABLET ORAL EVERY 6 HOURS
Qty: 120 TABLET | Refills: 0 | Status: SHIPPED | OUTPATIENT
Start: 2021-09-04

## 2021-09-03 RX ORDER — OXYCODONE HYDROCHLORIDE AND ACETAMINOPHEN 5; 325 MG/1; MG/1
1 TABLET ORAL EVERY 4 HOURS PRN
Status: DISCONTINUED | OUTPATIENT
Start: 2021-09-03 | End: 2021-09-04 | Stop reason: HOSPADM

## 2021-09-03 RX ORDER — SODIUM CHLORIDE 9 MG/ML
25 INJECTION, SOLUTION INTRAVENOUS PRN
Status: DISCONTINUED | OUTPATIENT
Start: 2021-09-03 | End: 2021-09-04 | Stop reason: HOSPADM

## 2021-09-03 RX ORDER — MORPHINE SULFATE 1 MG/ML
INJECTION, SOLUTION EPIDURAL; INTRATHECAL; INTRAVENOUS PRN
Status: DISCONTINUED | OUTPATIENT
Start: 2021-09-03 | End: 2021-09-03 | Stop reason: SDUPTHER

## 2021-09-03 RX ADMIN — OXYCODONE HYDROCHLORIDE AND ACETAMINOPHEN 2 TABLET: 5; 325 TABLET ORAL at 22:05

## 2021-09-03 RX ADMIN — PHENYLEPHRINE HYDROCHLORIDE 100 MCG: 10 INJECTION INTRAVENOUS at 08:49

## 2021-09-03 RX ADMIN — CEFAZOLIN 2000 MG: 10 INJECTION, POWDER, FOR SOLUTION INTRAVENOUS at 16:36

## 2021-09-03 RX ADMIN — PHENYLEPHRINE HYDROCHLORIDE 100 MCG: 10 INJECTION INTRAVENOUS at 08:36

## 2021-09-03 RX ADMIN — PHENYLEPHRINE HYDROCHLORIDE 100 MCG: 10 INJECTION INTRAVENOUS at 08:46

## 2021-09-03 RX ADMIN — KETOROLAC TROMETHAMINE 30 MG: 30 INJECTION, SOLUTION INTRAMUSCULAR; INTRAVENOUS at 22:45

## 2021-09-03 RX ADMIN — DIPHENHYDRAMINE HYDROCHLORIDE 25 MG: 50 INJECTION, SOLUTION INTRAMUSCULAR; INTRAVENOUS at 10:13

## 2021-09-03 RX ADMIN — SODIUM CHLORIDE: 9 INJECTION, SOLUTION INTRAVENOUS at 08:53

## 2021-09-03 RX ADMIN — Medication 87.3 MILLI-UNITS/MIN: at 09:02

## 2021-09-03 RX ADMIN — PHENYLEPHRINE HYDROCHLORIDE 100 MCG: 10 INJECTION INTRAVENOUS at 08:45

## 2021-09-03 RX ADMIN — PHENYLEPHRINE HYDROCHLORIDE 100 MCG: 10 INJECTION INTRAVENOUS at 08:39

## 2021-09-03 RX ADMIN — SODIUM CHLORIDE, POTASSIUM CHLORIDE, SODIUM LACTATE AND CALCIUM CHLORIDE: 600; 310; 30; 20 INJECTION, SOLUTION INTRAVENOUS at 07:01

## 2021-09-03 RX ADMIN — PHENYLEPHRINE HYDROCHLORIDE 100 MCG: 10 INJECTION INTRAVENOUS at 08:53

## 2021-09-03 RX ADMIN — Medication 909 ML/HR: at 09:01

## 2021-09-03 RX ADMIN — SODIUM CITRATE AND CITRIC ACID MONOHYDRATE 30 ML: 500; 334 SOLUTION ORAL at 07:57

## 2021-09-03 RX ADMIN — KETOROLAC TROMETHAMINE 30 MG: 30 INJECTION, SOLUTION INTRAMUSCULAR; INTRAVENOUS at 10:47

## 2021-09-03 RX ADMIN — ONDANSETRON 4 MG: 2 INJECTION, SOLUTION INTRAMUSCULAR; INTRAVENOUS at 08:16

## 2021-09-03 RX ADMIN — MORPHINE SULFATE 0.2 MG: 1 INJECTION EPIDURAL; INTRATHECAL; INTRAVENOUS at 08:34

## 2021-09-03 RX ADMIN — DIPHENHYDRAMINE HYDROCHLORIDE 25 MG: 50 INJECTION INTRAMUSCULAR; INTRAVENOUS at 17:00

## 2021-09-03 RX ADMIN — PHENYLEPHRINE HYDROCHLORIDE 200 MCG: 10 INJECTION INTRAVENOUS at 08:38

## 2021-09-03 RX ADMIN — DOCUSATE SODIUM 50 MG AND SENNOSIDES 8.6 MG 2 TABLET: 8.6; 5 TABLET, FILM COATED ORAL at 22:05

## 2021-09-03 RX ADMIN — SODIUM CHLORIDE, POTASSIUM CHLORIDE, SODIUM LACTATE AND CALCIUM CHLORIDE: 600; 310; 30; 20 INJECTION, SOLUTION INTRAVENOUS at 11:00

## 2021-09-03 RX ADMIN — CEFAZOLIN 2000 MG: 10 INJECTION, POWDER, FOR SOLUTION INTRAVENOUS at 08:14

## 2021-09-03 RX ADMIN — KETOROLAC TROMETHAMINE 30 MG: 30 INJECTION, SOLUTION INTRAMUSCULAR; INTRAVENOUS at 16:40

## 2021-09-03 ASSESSMENT — PULMONARY FUNCTION TESTS
PIF_VALUE: 0

## 2021-09-03 ASSESSMENT — PAIN SCALES - GENERAL
PAINLEVEL_OUTOF10: 3
PAINLEVEL_OUTOF10: 2
PAINLEVEL_OUTOF10: 6
PAINLEVEL_OUTOF10: 5
PAINLEVEL_OUTOF10: 7

## 2021-09-03 NOTE — LACTATION NOTE
This note was copied from a baby's chart. Lactation round made. Family requested a oral check on baby for tongue and lip tie. Their 1year old son had a tongue and lip tie that needed intervention. Potential tongue and lip tie noted. Writer educates family on being aware for signs to watch out for, painful feeds and not gaining weight. Family verbalizes understanding.

## 2021-09-03 NOTE — H&P
OBSTETRICAL HISTORY AND PHYSICAL    Provider:  ROBBIN Bermudez CNM      Date: 9/3/2021  Time: 7:32 AM    Patient Name: Cindy Meng  Patient : 1985  Room/Bed: 5426/6476-85  Admission Date/Time: 9/3/2021  6:18 AM  MRN #: [id number]  [facility] #: [id number]    Primary Care Physician: Radha Ospina MD  Admitting Provider: Jameson De Luna is a 28 y.o. female [id number]    CC:  Section (Repeat)       HPI: Cindy Meng is a 28 y.o. [id number] at 36w4d who presents for repeat  delivery. The patient reports fetal movement is present, denies contractions, denies loss of fluid, denies vaginal bleeding. DATING:  LMP: Patient's last menstrual period was 2020. Estimated Date of Delivery: 21   Based on: 11 week    PREGNANCY RISK FACTORS:  Patient Active Problem List   Diagnosis    R complex Adnexal cyst (2.74 x 1.41 x 1.52cm    Prior CS x 2    Hx of preeclampsia, prior pregnancy, currently pregnant, second trimester    Hx of preeclampsia (G2)    Hyperthyroidism (no meds)    RLTCS / M Apg 8/9 Wt 8#1    Dehiscence of old uterine scar with extension before onset of labor, antepartum, third trimester    Family history of breast cancer in sister    Other specified personal risk factors, not elsewhere classified    Encounter for prophylactic surgery for risk factor related to malignant neoplasm of ovary    High risk pregnancy, antepartum         Allergies: Allergies as of 2021 - Fully Reviewed 2021   Allergen Reaction Noted    Amoxicillin-pot clavulanate  2013    Codeine Rash 2015    Sulfa antibiotics Rash 2013       Medications:  No current facility-administered medications on file prior to encounter.      Current Outpatient Medications on File Prior to Encounter   Medication Sig Dispense Refill    DiphenhydrAMINE HCl (BENADRYL ALLERGY CHILDRENS PO) Take by mouth      seizures, negative weakness  Behavior/Psych: negative depression, negative anxiety, negative SI, negative HI    Physical Exam:  Vitals:    21 0654 21 0656   BP:  116/71   Pulse:  86   Resp:  16   Temp:  98.8 °F (37.1 °C)   Weight: 160 lb (72.6 kg)    Height: 5' 4\" (1.626 m)        General appearance:  Alert, no apparent distress, and cooperative  Skin:  Warm, dry, no rashes or erythema  Neurologic:  alert, oriented, normal speech and gait, normal reflexes  Lungs:  No increased work of breathing, good air exchange, clear to auscultation bilaterally, no crackles or wheezing  Heart:  regular rate and rhythm and no murmur   Breast:  Deferred   Abdomen:  soft, non-tender, no right upper quadrant tenderness, no CVA tenderness. Gravid and consistent with her gestational age,   Uterus non-tender, no signs of abruption and no signs of chorioamnionitis  Extremities:  no calf tenderness, non edematous, DTRs: normal      FETAL HEART RATE:       Baseline: 130     Variability: moderate     Accelerations: present     Decelerations: absent            CONTRACTIONS: Frequency: none         PRENATAL LAB RESULTS:   Blood Type/Rh: O pos  Antibody Screen: negative  Hemoglobin, Hematocrit, Platelets: pending  Rubella: immune  T.  Pallidum, IgG: non-reactive  Hepatitis B Surface Antigen: non-reactive   Hepatitis C Antibody: not done   HIV: non-reactive   Sickle Cell Screen: not available  Gonorrhea: not done  Chlamydia: not done  Urine culture: negative, date: 21    1 hour Glucose Tolerance Test: 89      Group B Strep: negative PCR on 21  Cystic Fibrosis Screen: negative  First Trimester Screen: patient declined  MSAFP/Multiple Markers: patient declined  Non-Invasive Prenatal Testing: patient declined  Anatomy US: anterior placenta, 3VC, female gender,  anatomy    ASSESSMENT/PLAN:  Abril Davila is a 28 y.o. female  [id number] At 36w4d  Admit: Repeat  Delivery hx of lower uterine segment/ dehiscense  - cEFM/[place]  -  ml/hr LR  - COVID testing not indicated patient is vaccination  - Written consent for UDS obtained, specimen collected  - Diet: NPO  - Ancef pre-op  - Bicitra pre-op    Admitting physician     History of PreEclampsia in previous pregnancy  - baseline labs completed during pregnancy  - Normotensive blood pressure on admission    Hx of Hyperthyroidism  - TSH 1.10 on 2/4/21    FHR: Category 1      Risks, benefits, alternatives and possible complications have been discussed in detail with the patient. Admission, and post admission procedures and expectations were discussed in detail. All questions were answered. Discussion of both the risks benefits and alternative options were discussed as well as the potential maternal and fetal morbidity risks. Risks, benefits, alternatives and possible complications have been discussed in detail with the patient. Admission, and post admission procedures and expectations were discussed in detail. All questions were answered.       Tere Castro, [address]  Midwife APRN-CNM  9/3/2021, 7:32 AM

## 2021-09-03 NOTE — ANESTHESIA PRE PROCEDURE
Department of Anesthesiology  Preprocedure Note       Name:  Barbara Patient   Age:  28 y.o.  :  1985                                          MRN:  [id number]         Date:  9/3/2021      Surgeon: Sharita Arcos):  Dheeraj Alcantara DO    Procedure: Procedure(s):  REPEAT  SECTION WITH POSSIBLE BILATERAL SAPINGECTOMY    Medications prior to admission:   Prior to Admission medications    Medication Sig Start Date End Date Taking? Authorizing Provider   DiphenhydrAMINE HCl (BENADRYL ALLERGY CHILDRENS PO) Take by mouth    Historical Provider, MD   Pediatric Multivit-Minerals-C (RA GUMMY VITAMINS & MINERALS) CHEW Take by mouth    Historical Provider, MD       Current medications:    Current Facility-Administered Medications   Medication Dose Route Frequency Provider Last Rate Last Admin    lactated ringers infusion   IntraVENous Continuous Kirsty Bronson MD        sodium chloride flush 0.9 % injection 5-40 mL  5-40 mL IntraVENous 2 times per day Kirsty Bronson MD        sodium chloride flush 0.9 % injection 5-40 mL  5-40 mL IntraVENous PRN Kirsty Bronson MD        0.9 % sodium chloride infusion  25 mL IntraVENous PRN Kirsty Bronson MD        lidocaine PF 1 % injection 1 mL  1 mL IntraDERmal Once PRN Kirsty Bronson MD        ceFAZolin (ANCEF) 2000 mg in dextrose 5 % 50 mL IVPB  2,000 mg IntraVENous Once ROBBIN Raines - ARMANDO        citric acid-sodium citrate (BICITRA) solution 30 mL  30 mL Oral Once Yanna APRN - ARMANDO           Allergies:     Allergies   Allergen Reactions    Amoxicillin-Pot Clavulanate     Codeine Rash    Sulfa Antibiotics Rash       Problem List:    Patient Active Problem List   Diagnosis Code    R complex Adnexal cyst (2.74 x 1.41 x 1.52cm N94.9    Prior CS x 2 O34.219    Normal pregnancy in multigravida in second trimester Z34.82    Low implantation of placenta without hemorrhage O44.40    Hx of preeclampsia, prior pregnancy, currently pregnant, second trimester O09.292    Anemia  O99.013    Hx of preeclampsia (G2) O0.36    Encounter for supervision of normal pregnancy in multigravida in third trimester Z34.83    LGA (large for gestational age) fetus affecting mother, antepartum O43.56X0    Suspected placental problem not found Z03.72    Suspected LGA O36.60X0    Hyperthyroidism (no meds) E05.90    HRP (high risk pregnancy), third trimester O09.93    Oligohydramnios O41.00X0    RLTCS 18 M Apg 8/9 Wt 8#1 O82    At risk for impaired parent-infant bonding Z91.89    Dehiscence of old uterine scar with extension before onset of labor, antepartum, third trimester O75.80    Family history of breast cancer in sister Z80.2    Other specified personal risk factors, not elsewhere classified Z91.89    Encounter for prophylactic surgery for risk factor related to malignant neoplasm of ovary Z40.02       Past Medical History:        Diagnosis Date    Hyperthyroidism     referral sent to dr. Ryann Castillo allergies        Past Surgical History:        Procedure Laterality Date     SECTION  2014     SECTION, LOW TRANSVERSE  12/5/15    UT  DELIVERY ONLY N/A 2018     SECTION performed by Pranav Cullen DO at [facility] L[facility]       Social History:    Social History     Tobacco Use    Smoking status: Never Smoker    Smokeless tobacco: Never Used   Substance Use Topics    Alcohol use: Not Currently                                Counseling given: Not Answered      Vital Signs (Current):   Vitals:    21 0654   Weight: 160 lb (72.6 kg)   Height: 5' 4\" (1.626 m)                                              BP Readings from Last 3 Encounters:   21 120/70   21 110/62   21 112/62       NPO Status: Time of last liquid consumption:                         Time of last solid consumption:                         Date of last liquid consumption: 21                        Date of last solid food consumption: 09/02/21    BMI:   Wt Readings from Last 3 Encounters:   09/03/21 160 lb (72.6 kg)   08/31/21 160 lb (72.6 kg)   08/24/21 159 lb (72.1 kg)     Body mass index is 27.46 kg/m². CBC:   Lab Results   Component Value Date    WBC 9.7 07/06/2021    RBC 3.43 07/06/2021    HGB 10.9 07/06/2021    HCT 35.3 07/06/2021    .9 07/06/2021    RDW 12.9 07/06/2021     07/06/2021       CMP:   Lab Results   Component Value Date     02/04/2021    K 4.2 02/04/2021     02/04/2021    CO2 21 02/04/2021    BUN 8 02/04/2021    CREATININE 0.47 02/04/2021    GFRAA >60 02/04/2021    LABGLOM >60 02/04/2021    GLUCOSE 89 07/06/2021    GLUCOSE 98 02/04/2021    PROT 7.8 02/04/2021    CALCIUM 9.7 02/04/2021    BILITOT 0.21 02/04/2021    ALKPHOS 56 02/04/2021    AST 16 02/04/2021    ALT 10 02/04/2021       POC Tests: No results for input(s): POCGLU, POCNA, POCK, POCCL, POCBUN, POCHEMO, POCHCT in the last 72 hours. Coags:   Lab Results   Component Value Date    PROTIME 9.4 02/03/2015    INR 0.9 02/03/2015    APTT 25.6 02/03/2015       HCG (If Applicable):   Lab Results   Component Value Date    HCGQUANT 130,274 (H) 05/01/2018        ABGs: No results found for: PHART, PO2ART, JWX2AYG, POQ7SJD, BEART, M0WUYANV     Type & Screen (If Applicable):  No results found for: LABABO, LABRH    Drug/Infectious Status (If Applicable):  No results found for: HIV, HEPCAB    COVID-19 Screening (If Applicable): No results found for: COVID19        Anesthesia Evaluation  Patient summary reviewed and Nursing notes reviewed history of anesthetic complications (lots of itching after all c/s):    Airway: Mallampati: III  TM distance: >3 FB   Neck ROM: full  Mouth opening: > = 3 FB Dental: normal exam         Pulmonary:normal exam  breath sounds clear to auscultation                            ROS comment: Seasonal allergies   Cardiovascular:Negative CV ROS            Rhythm: regular  Rate: normal                    Neuro/Psych: Negative Neuro/Psych ROS              GI/Hepatic/Renal: Neg GI/Hepatic/Renal ROS            Endo/Other:    (+) hyperthyroidism (no longer on medications, has had normal levels after this.)::., .                  ROS comment:   GA - 38wks  Previous C/S X3 Abdominal:             Vascular: negative vascular ROS. Other Findings:           Anesthesia Plan      spinal     ASA 2           MIPS: Postoperative opioids intended and Prophylactic antiemetics administered. Anesthetic plan and risks discussed with patient. Plan discussed with CRNA.                   Jen Chacko MD   9/3/2021

## 2021-09-03 NOTE — LACTATION NOTE
This note was copied from a baby's chart. Lactation round made. Mother states baby just nursed. Mother denies questions or assistance at this time.

## 2021-09-03 NOTE — CARE COORDINATION
Education information given to mother and she verbalizes understanding about the following:  Understanding your baby's  screening tests pamphlet. Hour for [employer]. Patient Safety Education. Infant security including the four band system and the HUGS system. Skin to Skin Contact for You and Your Baby. Benefits of breastfeeding. QR codes for videos online including: Breastfeeding Massage/Hand Express, Breastfeeding Positions, and Breastfeeding latch. Risks of formula given and discussed with mother. What do the experts say about the use of pacifiers/supplementation of a  infant? Safe sleep for your baby (supplied by [place])    Mother encouraged to review pamphlets and watch videos (if able). Mother chooses to Breastfeed.

## 2021-09-03 NOTE — ANESTHESIA PROCEDURE NOTES
Spinal Block    Start time: 9/3/2021 8:29 AM  End time: 9/3/2021 8:34 AM  Reason for block: primary anesthetic  Staffing  Anesthesiologist: Viky Coleman MD  Resident/CRNA: ROBBIN Herman - CRNA  Other anesthesia staff: Mesfin Casanova RN  Spinal Block  Patient position: sitting  Prep: Betadine  Patient monitoring: cardiac monitor, continuous pulse ox and frequent blood pressure checks  Approach: midline  Location: L4/L5  Guidance: paresthesia technique  Provider prep: mask and sterile gloves  Local infiltration: lidocaine  Dose: 0.2  Agent: bupivacaine  Adjuvant: duramorph  Dose: 1.6  Dose: 1.6  Needle  Needle type: Pencan   Needle gauge: 24 G  Needle length: 4 in  Assessment  Sensory level: T4  Events: cerebrospinal fluid  Swirl obtained: Yes  CSF: clear  Attempts: 2  Hemodynamics: stable  Additional Notes  First attempt by SRNA, second successful attempt by CRNA

## 2021-09-03 NOTE — LACTATION NOTE
This note was copied from a baby's chart. Lactation round made. Mother wishes to breastfeed. Mother states she breast fed all of her kids with the longest duration 8 months. Baby nursing well. RN assisted with latching after delivery. Audible swallows heard while baby on breast. Mother does not have a breast pump but interested in trying the [facility] and also states she heard good things regarding the [employer]. Writer gives mother information on [address] and breast pump available through website. Writer educates mother on infant sleepy phase and encourages mother to undress baby every 2-3 hours unless baby shows hunger signs prior, do skin to skin to wake to nurse. If baby does not wake to nurse, hand express and give baby expressed colostrum. Writer educates mother on cluster feeding and encourages mother to put baby to breast rather than a pacifier or formula to avoid nipple confusion and to help establish milk supply. Mother verbalizes understanding. Writer encourages mother to call out for breastfeeding assistance and questions. Handouts given and explained to mother:  Breastfeeding resource Guide; Breastfeeding Log for the first week; When to Call a Lactation Consultant, Colostrum First, Norms in the First 3 days; feeding cues; Baby's second Night; [facility]'s inside track, a resource for breastfeeding mothers: Milk Expression and Pumping; How to Achieve a Deep Latch; Tips for breastfeeding Moms/Daily meal plan; [employer]'s Inside Track, a resource for breastfeeding mother: Managing Your Milk Supply:Going with the Flow;  [facility]'s Inside Track, a resource for breastfeeding mothers: Using Your Hands to Express Your Milk; Signs of a Good Feeding, Signs of a Poor Feeding. Discussed handouts with mother verbalizing understanding and encouraged mother  to view video clips.

## 2021-09-03 NOTE — ANESTHESIA POSTPROCEDURE EVALUATION
Department of Anesthesiology  Postprocedure Note    Patient: Jose Pickens  MRN: [id number]  YOB: 1985  Date of evaluation: 9/3/2021  Time:  12:32 PM     Procedure Summary     Date: 21 Room / Location: [facility] L&D OR / [address]    Anesthesia Start: 08 Anesthesia Stop: 389    Procedure:  [facility] (N/A ) Diagnosis:       (PREGNANCY-REPEAT  DUE DATE  / RISK REDUCTION BILATERAL SALPINGECTOMY DUE TO HISTORY OF UTERINE DEHISCENCE / [address] [place])      (OFFICE TO CALL WITH UPDATED COVID STATUS)    Surgeons: Remigio Toledo DO Responsible Provider: Jade Bean MD    Anesthesia Type: spinal ASA Status: 2          Anesthesia Type: spinal    Olivia Phase I: Olivia Score: 10    Olivia Phase II: Olivia Score: 10    Last vitals: Reviewed and per EMR flowsheets.        Anesthesia Post Evaluation    Comments: POST- ANESTHESIA EVALUATION       Pt Name: Jose Picekns  MRN: [id number]  YOB: 1985  Date of evaluation: 9/3/2021  Time:  12:32 PM      /60   Pulse 74   Temp 97.4 °F (36.3 °C)   Resp 16   Ht 5' 4\" (1.626 m)   Wt 160 lb (72.6 kg)   LMP 2020   SpO2 97%   BMI 27.46 kg/m²      Consciousness Level  Awake  Cardiopulmonary Status  Stable  Pain Adequately Treated YES  Nausea / Vomiting  NO  Adequate Hydration  YES  Anesthesia Related Complications NONE      Electronically signed by Jade Bean MD on 9/3/2021 at 12:32 PM

## 2021-09-04 VITALS
OXYGEN SATURATION: 97 % | RESPIRATION RATE: 16 BRPM | SYSTOLIC BLOOD PRESSURE: 98 MMHG | DIASTOLIC BLOOD PRESSURE: 54 MMHG | TEMPERATURE: 97.6 F | HEIGHT: 64 IN | HEART RATE: 73 BPM | BODY MASS INDEX: 27.31 KG/M2 | WEIGHT: 160 LBS

## 2021-09-04 PROBLEM — O09.90 HIGH RISK PREGNANCY, ANTEPARTUM: Status: RESOLVED | Noted: 2021-09-03 | Resolved: 2021-09-04

## 2021-09-04 LAB
ABSOLUTE EOS #: 0.1 K/UL (ref 0–0.4)
ABSOLUTE IMMATURE GRANULOCYTE: ABNORMAL K/UL (ref 0–0.3)
ABSOLUTE LYMPH #: 1.8 K/UL (ref 1–4.8)
ABSOLUTE MONO #: 0.7 K/UL (ref 0.1–1.3)
BASOPHILS # BLD: 0 % (ref 0–2)
BASOPHILS ABSOLUTE: 0 K/UL (ref 0–0.2)
DIFFERENTIAL TYPE: ABNORMAL
EOSINOPHILS RELATIVE PERCENT: 1 % (ref 0–4)
HCT VFR BLD CALC: 28.2 % (ref 36–46)
HEMOGLOBIN: 9.4 G/DL (ref 12–16)
IMMATURE GRANULOCYTES: ABNORMAL %
LYMPHOCYTES # BLD: 18 % (ref 24–44)
MCH RBC QN AUTO: 30.3 PG (ref 26–34)
MCHC RBC AUTO-ENTMCNC: 33.4 G/DL (ref 31–37)
MCV RBC AUTO: 90.8 FL (ref 80–100)
MONOCYTES # BLD: 8 % (ref 1–7)
NRBC AUTOMATED: ABNORMAL PER 100 WBC
PDW BLD-RTO: 13.6 % (ref 11.5–14.9)
PLATELET # BLD: 279 K/UL (ref 150–450)
PLATELET ESTIMATE: ABNORMAL
PMV BLD AUTO: 8.4 FL (ref 6–12)
RBC # BLD: 3.1 M/UL (ref 4–5.2)
RBC # BLD: ABNORMAL 10*6/UL
SEG NEUTROPHILS: 73 % (ref 36–66)
SEGMENTED NEUTROPHILS ABSOLUTE COUNT: 7.3 K/UL (ref 1.3–9.1)
WBC # BLD: 9.9 K/UL (ref 3.5–11)
WBC # BLD: ABNORMAL 10*3/UL

## 2021-09-04 PROCEDURE — 85025 COMPLETE CBC W/AUTO DIFF WBC: CPT

## 2021-09-04 PROCEDURE — 59510 CESAREAN DELIVERY: CPT | Performed by: OBSTETRICS & GYNECOLOGY

## 2021-09-04 PROCEDURE — 6360000002 HC RX W HCPCS: Performed by: ADVANCED PRACTICE MIDWIFE

## 2021-09-04 PROCEDURE — 99024 POSTOP FOLLOW-UP VISIT: CPT | Performed by: OBSTETRICS & GYNECOLOGY

## 2021-09-04 PROCEDURE — 6370000000 HC RX 637 (ALT 250 FOR IP): Performed by: OBSTETRICS & GYNECOLOGY

## 2021-09-04 PROCEDURE — 6370000000 HC RX 637 (ALT 250 FOR IP): Performed by: ADVANCED PRACTICE MIDWIFE

## 2021-09-04 PROCEDURE — 58700 REMOVAL OF FALLOPIAN TUBE: CPT | Performed by: OBSTETRICS & GYNECOLOGY

## 2021-09-04 PROCEDURE — 36415 COLL VENOUS BLD VENIPUNCTURE: CPT

## 2021-09-04 RX ORDER — DIPHENHYDRAMINE HCL 25 MG
25 TABLET ORAL EVERY 6 HOURS PRN
Status: DISCONTINUED | OUTPATIENT
Start: 2021-09-04 | End: 2021-09-04 | Stop reason: HOSPADM

## 2021-09-04 RX ADMIN — Medication 1 TABLET: at 08:33

## 2021-09-04 RX ADMIN — OXYCODONE HYDROCHLORIDE AND ACETAMINOPHEN 2 TABLET: 5; 325 TABLET ORAL at 13:58

## 2021-09-04 RX ADMIN — OXYCODONE HYDROCHLORIDE AND ACETAMINOPHEN 2 TABLET: 5; 325 TABLET ORAL at 02:09

## 2021-09-04 RX ADMIN — IBUPROFEN 600 MG: 600 TABLET, FILM COATED ORAL at 05:33

## 2021-09-04 RX ADMIN — IBUPROFEN 600 MG: 600 TABLET, FILM COATED ORAL at 16:54

## 2021-09-04 RX ADMIN — DOCUSATE SODIUM 50 MG AND SENNOSIDES 8.6 MG 2 TABLET: 8.6; 5 TABLET, FILM COATED ORAL at 08:33

## 2021-09-04 RX ADMIN — CEFAZOLIN 2000 MG: 10 INJECTION, POWDER, FOR SOLUTION INTRAVENOUS at 00:08

## 2021-09-04 RX ADMIN — OXYCODONE HYDROCHLORIDE AND ACETAMINOPHEN 2 TABLET: 5; 325 TABLET ORAL at 06:19

## 2021-09-04 RX ADMIN — MAGNESIUM HYDROXIDE 30 ML: 400 SUSPENSION ORAL at 08:33

## 2021-09-04 RX ADMIN — DIPHENHYDRAMINE HYDROCHLORIDE 25 MG: 50 INJECTION INTRAMUSCULAR; INTRAVENOUS at 06:19

## 2021-09-04 RX ADMIN — IBUPROFEN 600 MG: 600 TABLET, FILM COATED ORAL at 11:03

## 2021-09-04 RX ADMIN — DIPHENHYDRAMINE HYDROCHLORIDE 25 MG: 50 INJECTION INTRAMUSCULAR; INTRAVENOUS at 00:08

## 2021-09-04 ASSESSMENT — PAIN SCALES - GENERAL
PAINLEVEL_OUTOF10: 4
PAINLEVEL_OUTOF10: 2
PAINLEVEL_OUTOF10: 5
PAINLEVEL_OUTOF10: 3
PAINLEVEL_OUTOF10: 4
PAINLEVEL_OUTOF10: 7
PAINLEVEL_OUTOF10: 7
PAINLEVEL_OUTOF10: 5
PAINLEVEL_OUTOF10: 4
PAINLEVEL_OUTOF10: 0
PAINLEVEL_OUTOF10: 7
PAINLEVEL_OUTOF10: 2

## 2021-09-04 ASSESSMENT — PAIN DESCRIPTION - ORIENTATION
ORIENTATION: MID
ORIENTATION: MID

## 2021-09-04 ASSESSMENT — PAIN DESCRIPTION - LOCATION
LOCATION: ABDOMEN
LOCATION: ABDOMEN

## 2021-09-04 ASSESSMENT — PAIN DESCRIPTION - FREQUENCY: FREQUENCY: CONTINUOUS

## 2021-09-04 ASSESSMENT — PAIN DESCRIPTION - DESCRIPTORS
DESCRIPTORS: SORE
DESCRIPTORS: SORE

## 2021-09-04 NOTE — DISCHARGE SUMMARY
Obstetric Discharge Summary    Patient Name: Michael Mancia  Patient : 1985  Room/Bed: 54/0573-85  Primary Care Physician: Ari Haro MD  Admit Date: 9/3/2021  6:18 AM  MRN #: [id number]  [facility] #: [id number]    Admitting Diagnosis  Michael Mancia is a 28 y.o. 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 before onset of labor, antepartum, third trimester    Family history of breast cancer in sister    Other specified personal risk factors, not elsewhere classified    Encounter for prophylactic surgery for risk factor related to malignant neoplasm of ovary         Reasons for Admission on 9/3/2021  6:18 AM  High risk pregnancy, antepartum [O09.90]  No comment available  Onset of Labor   Section (Repeat)    Prenatal Procedures  None    Intrapartum Procedures:   Delivery: : Low Cervical, Transverse and RRS                      Postpartum Procedures  None  Signs and symptoms of depression reviewed and evaluated: absent    Postpartum/Operative Complications:  none      [place] Data  Information for the patient's :  Camillo Shuck Girl Brenda Paniagua [[id number]]   female   Birth Weight: 7 lb 4 oz (3.289 kg)     Discharge With Mother  Complications: No    Discharge Diagnosis:  1Robyn Mancia is a 28 y.o. female  2. [id number]  3. 38w3d  4. Delivered a Live Born female by  delivery.       Discharge Information/Patient Instructions:   Monse Paiz   Home Medication Instructions [id number]    Printed on:09/04/21 0730   Medication Information                      DiphenhydrAMINE HCl (BENADRYL ALLERGY CHILDRENS PO)  Take by mouth             ibuprofen (ADVIL;MOTRIN) 600 MG tablet  Take 1 tablet by mouth every 6 hours             oxyCODONE-acetaminophen (PERCOCET) 5-325 MG per tablet  Take 1 tablet by mouth every 6 hours as needed for Pain for up to 5 days. Intended supply: 5 days. Take lowest dose possible to manage pain             Pediatric Multivit-Minerals-C (RA GUMMY VITAMINS & MINERALS) CHEW  Take by mouth             sennosides-docusate sodium (SENOKOT-S) 8.6-50 MG tablet  Take 2 tablets by mouth 2 times daily                 No discharge procedures on file. Activity: no driving for 2 weeks and no sex for 6 weeks  Diet: regular diet  Wound Care: keep wound clean and dry      Discharge to: Home  Follow up in 2 weeks       Discharge Date: 9/4/2021      Luma Mclaughlin DO      Comments:   Home care, Follow-up care and birth control were reviewed. Signs and symptoms of mastitis and Post Partum Depression were reviewed. The patient is to notify her physician if any of these occur. The patient was counseled on secondary smoke risks and the increased risk of sudden infant death syndrome and respiratory problems to her baby with exposure. She was counseled on various alternate recommendations to decrease the exposure to secondary smoke to her children.

## 2021-09-04 NOTE — FLOWSHEET NOTE
Discharge teaching and instructions completed. AVS reviewed. [facility] Save your life: Post-Birth Warning Signs handout reviewed and given to mother. Understanding verbalized. Printed prescriptions given to pt. Patient voiced understanding regarding prescriptions, follow up appointments, and care of self at home. Discharged to home. Speech pathology note  Reviewed chart and discussed case with RN. Note patient and family no longer desire aggressive therapy including a feeding tube. Patient now comfort measures only, and patient to discharge home with hospice. Would consider allowing PO for pleasure given patient discharging with hospice. SLP will sign off. Please re-consult if further needs arise. Thank you.     Bc Sol, [facility]-SLP

## 2021-09-04 NOTE — PLAN OF CARE
Problem: Pain:  Goal: Pain level will decrease  Description: Pain level will decrease  Outcome: Completed     Problem: Pain:  Goal: Control of acute pain  Description: Control of acute pain  Outcome: Completed     Problem: Pain:  Goal: Control of chronic pain  Description: Control of chronic pain  Outcome: Completed     Problem: Discharge Planning:  Goal: Discharged to appropriate level of care  Description: Discharged to appropriate level of care  Outcome: Completed     Problem: Fluid Volume - Imbalance:  Goal: Absence of postpartum hemorrhage signs and symptoms  Description: Absence of postpartum hemorrhage signs and symptoms  Outcome: Completed     Problem: Fluid Volume - Imbalance:  Goal: Absence of imbalanced fluid volume signs and symptoms  Description: Absence of imbalanced fluid volume signs and symptoms  Outcome: Completed     Problem: Infection - Surgical Site:  Goal: Will show no infection signs and symptoms  Description: Will show no infection signs and symptoms  Outcome: Completed     Problem: Mood - Altered:  Goal: Mood stable  Description: Mood stable  Outcome: Completed     Problem: Nausea/Vomiting:  Goal: Absence of nausea/vomiting  Description: Absence of nausea/vomiting  Outcome: Completed     Problem: Pain - Acute:  Goal: Pain level will decrease  Description: Pain level will decrease  Outcome: Completed     Problem: Urinary Retention:  Goal: Urinary elimination within specified parameters  Description: Urinary elimination within specified parameters  Outcome: Completed     Problem: Venous Thromboembolism:  Goal: Will show no signs or symptoms of venous thromboembolism  Description: Will show no signs or symptoms of venous thromboembolism  Outcome: Completed     Problem: Venous Thromboembolism:  Goal: Absence of signs or symptoms of impaired coagulation  Description: Absence of signs or symptoms of impaired coagulation  Outcome: Completed

## 2021-09-04 NOTE — PROGRESS NOTES
POST OPERATIVE DAY # 1  S/P  Section-: Low Cervical, Transverse-RRS    Patient Name: Lacy Shine  Patient : 1985  Room/Bed: [address] [place] [address]  Admission Date/Time: 9/3/2021  6:18 AM  MRN #: [id number]  [facility] #: [id number]        Date: 2021  Time: 7:25 AM        Lacy Shine is a 28 y.o. female [id number]    This patient was seen today. She Delivered on 9/3/2021. Her pregnancy was complicated by:     Patient Active Problem List   Diagnosis    R complex Adnexal cyst (2.74 x 1.41 x 1.52cm    Prior CS x 2    Hx of preeclampsia, prior pregnancy, currently pregnant, second trimester    Hx of preeclampsia (G2)    Hyperthyroidism (no meds)    RLTCS 18 M Apg 8/ Wt 8#1    Dehiscence of old uterine scar with extension before onset of labor, antepartum, third trimester    Family history of breast cancer in sister    Other specified personal risk factors, not elsewhere classified    Encounter for prophylactic surgery for risk factor related to malignant neoplasm of ovary       Today she is doing well without any chief complaint. Her lochia is light. She denies chest pain, shortness of breath, headache, lightheadedness, blurred vision, peripheral edema, palpitations, dry cough and fatigue. She is  breast feeding and she denies any signs or symptoms of mastitis. These were reviewed with her in detail. She is ambulating well. Her bowels are regular. Her voiding pattern is Normal. I reviewed signs and symptoms of post partum depression with the patient, she currently denies any of these symptoms. I have counseled this patient on secondary smoke risks and the increased incidence of sudden infant death syndrome.      Vitals:    21 2249 21 0219 21 0553 21 0554   BP: 136/87 121/69 110/77    Pulse: 78 88 77    Resp: 14 16 16    Temp: 97.6 °F (36.4 °C) 99.1 °F (37.3 °C) 97.8 °F (36.6 °C)    SpO2: 99% 98%  97%   Weight:       Height:           General appearance:   awake, alert, cooperative, no apparent distress, and appears stated age    Lungs:  Normal expansion. Clear to auscultation. No rales, rhonchi, or wheezing. Heart:  normal rate, normal S1 and S2, no gallops, intact distal pulses and no carotid bruits    Abdomen:  Abdomen soft, non-tender.  BS normal. No masses,  No organomegaly    Incision:  healing well, no drainage, no erythema    Uterus:  normal size, well involuted, firm, non-tender    Extremities:  no cyanosis, clubbing or edema present    Calf pain BL absent    Perineum:  not inspected        LABS:  Admission on 09/03/2021   Component Date Value Ref Range Status    Expiration Date 09/03/2021 09/06/2021,2359   Final    Arm Band Number 09/03/2021 [id number]   Final    ABO/Rh 09/03/2021 O POSITIVE   Final    Antibody Screen 09/03/2021 NEGATIVE   Final    WBC 09/03/2021 7.0  3.5 - 11.0 k/uL Final    RBC 09/03/2021 3.34* 4.0 - 5.2 m/uL Final    Hemoglobin 09/03/2021 10.1* 12.0 - 16.0 g/dL Final    Hematocrit 09/03/2021 30.5* 36 - 46 % Final    MCV 09/03/2021 91.5  80 - 100 fL Final    MCH 09/03/2021 30.2  26 - 34 pg Final    MCHC 09/03/2021 33.1  31 - 37 g/dL Final    RDW 09/03/2021 13.7  11.5 - 14.9 % Final    Platelets 58/15/1363 286  150 - 450 k/uL Final    MPV 09/03/2021 8.4  6.0 - 12.0 fL Final    NRBC Automated 09/03/2021 NOT REPORTED  per 100 WBC Final    Differential Type 09/03/2021 NOT REPORTED   Final    Seg Neutrophils 09/03/2021 70* 36 - 66 % Final    Lymphocytes 09/03/2021 20* 24 - 44 % Final    Monocytes 09/03/2021 7  1 - 7 % Final    Eosinophils % 09/03/2021 2  0 - 4 % Final    Basophils 09/03/2021 1  0 - 2 % Final    Immature Granulocytes 09/03/2021 NOT REPORTED  0 % Final    Segs Absolute 09/03/2021 5.00  1.3 - 9.1 k/uL Final    Absolute Lymph # 09/03/2021 1.40  1.0 - 4.8 k/uL Final    Absolute Mono # 09/03/2021 0.50  0.1 - 1.3 k/uL Final    Absolute Eos # 09/03/2021 0.10  0.0 - 0.4 k/uL Final    Basophils Absolute 09/03/2021 0. 00  0.0 - 0.2 k/uL Final    Absolute Immature Granulocyte 09/03/2021 NOT REPORTED  0.00 - 0.30 k/uL Final    WBC Morphology 09/03/2021 NOT REPORTED   Final    RBC Morphology 09/03/2021 NOT REPORTED   Final    Platelet Estimate 92/15/5822 NOT REPORTED   Final    Amphetamine Screen, Ur 09/03/2021 NEGATIVE  NEGATIVE Final    Comment:       (Positive cutoff 1000 ng/mL)                  Barbiturate Screen, Ur 09/03/2021 NEGATIVE  NEGATIVE Final    Comment:       (Positive cutoff 200 ng/mL)                  Benzodiazepine Screen, Urine 09/03/2021 NEGATIVE  NEGATIVE Final    Comment:       (Positive cutoff 200 ng/mL)                  Cocaine Metabolite, Urine 09/03/2021 NEGATIVE  NEGATIVE Final    Comment:       (Positive cutoff 300 ng/mL)                  Methadone Screen, Urine 09/03/2021 NEGATIVE  NEGATIVE Final    Comment:       (Positive cutoff 300 ng/mL)                  Opiates, Urine 09/03/2021 NEGATIVE  NEGATIVE Final    Comment:       (Positive cutoff 300 ng/mL)                  Phencyclidine, Urine 09/03/2021 NEGATIVE  NEGATIVE Final    Comment:       (Positive cutoff 25 ng/mL)                  Propoxyphene, Urine 09/03/2021 NOT REPORTED  NEGATIVE Final    Cannabinoid Scrn, Ur 09/03/2021 NEGATIVE  NEGATIVE Final    Comment:       (Positive cutoff 50 ng/mL)                  Oxycodone Screen, Ur 09/03/2021 NEGATIVE  NEGATIVE Final    Comment:       (Positive cutoff 100 ng/mL)                  Methamphetamine, Urine 09/03/2021 NOT REPORTED  NEGATIVE Final    Tricyclic Antidepressants, Urine 09/03/2021 NOT REPORTED  NEGATIVE Final    MDMA, Urine 09/03/2021 NOT REPORTED  NEGATIVE Final    Buprenorphine Urine 09/03/2021 NOT REPORTED  NEGATIVE Final    Test Information 09/03/2021 Assay provides medical screening only. The absence of expected drug(s) and/or metabolite(s) may indicate diluted or adulterated urine, limitations of testing or timing of collection.    Final    Comment: Testing for legal purposes should be confirmed by another method. To request confirmation   of test result, please call the lab within 7 days of sample submission.  T. pallidum, IgG 2021 NONREACTIVE  NONREACTIVE Final    Comment:       T. pallidum antibodies are not detected. There is no serological evidence of infection with T. pallidum (early primary syphilis   cannot be excluded). Retest in 2-4 weeks if syphilis is clinically suspect.           ]        Assessment:  POD # 1  S/P  Section Low transverse  Patient Active Problem List    Diagnosis Date Noted    Dehiscence of old uterine scar with extension before onset of labor, antepartum, third trimester 2021    Family history of breast cancer in sister 2021    Other specified personal risk factors, not elsewhere classified 2021     Discussed at length the risks and benefits of concurrent ovarian cancer risk reducing bilateral salpingectomy with patient's upcoming scheduled abdominal or pelvic surgery as this patient is at average risk for development of ovarian cancer. Advised patient that the primary benefit of such surgery is up to a 65% reduction in future risk of ovarian cancer. Finally, patient has been thoroughly counseled regarding the consequence of the loss of fertility following this procedure. Patient understands that this loss of fertility cannot be reversed and has expressed via verbal and written consent that her wishes are to proceed with this surgery for the purposes of reducing risk for ovarian cancer.  Encounter for prophylactic surgery for risk factor related to malignant neoplasm of ovary 2021     Discussed at length the risks and benefits of concurrent ovarian cancer risk reducing bilateral salpingectomy with patient's upcoming scheduled abdominal or pelvic surgery as this patient is at average risk for development of ovarian cancer.  Advised patient that the primary benefit of such surgery is up to a 65% reduction in future risk of ovarian cancer. Finally, patient has been thoroughly counseled regarding the consequence of the loss of fertility following this procedure. Patient understands that this loss of fertility cannot be reversed and has expressed via verbal and written consent that her wishes are to proceed with this surgery for the purposes of reducing risk for ovarian cancer.  Hyperthyroidism (no meds) 2018    RLTCS 18 M Apg 8/9 Wt 8#1 2018    Hx of preeclampsia (G2) 2018    Hx of preeclampsia, prior pregnancy, currently pregnant, second trimester 2018    Prior CS x 2 10/01/2014    R complex Adnexal cyst (2.74 x 1.41 x 1.52cm 10/25/2013     2.74 x 1.41 x 1.52cm         Condition: good        Plan:  1. Continue with current post op/post partum care  2. Discharge home per pt request    Home care, Follow up Care, and Birth Control were reviewed. Wound Care was reviewed. RTO 2 weeks    Secondary Smoke risks and Sudden Infant Death Syndrome were reviewed with recommendations. Signs and Symptoms of Post Partum Depression were reviewed. The patient is to call if any occur. Signs and symptoms of Mastitis were reviewed. The patient is to call if any occur for follow up. [unfilled]          [facility]  Jose [address] [place] [address], [address] [place] [address]  [phone]      Discharge Instructions for  Birth     During a  section (), an incision is made in the abdomen and uterus (womb) to deliver the baby. The normal hospital stay is 2-4 days. Steps to Take   Home Care    · For the first 1-2 weeks, ask for someone to help you at home. · Let people help you. Take frequent rest breaks. · For vaginal bleeding, use extra absorbent pads. · Keep the incision area clean and dry. · Ask your doctor about when it is safe to shower, bathe, or soak in water. · Avoid heavy lifting for six weeks.     Diet    After a hospital, call your doctor if any of the following occurs:   · Signs of infection, including fever and chills   · Heavy vaginal bleeding   · Foul-smelling vaginal discharge   · Excessive bleeding, redness, swelling, increasing pain or discharge from the incision site   · Nausea and/or vomiting that you cannot control with the medicines you were given after surgery, or which persist for more than two days after discharge from the hospital   · Pain that you cannot control with the medicines you have been given   · Swelling and/or pain in one or both legs   · Cough, shortness of breath, or chest pain   · Joint pain, fatigue, stiffness, rash, or other new symptoms   · Become dizzy or faint   If you think you have an emergency,  CALL 911  .              Physician's Name:   Electronically signed by Alessandra Benoit DO on 9/4/2021 at 7:25 AM

## 2021-09-07 LAB — SURGICAL PATHOLOGY REPORT: NORMAL

## 2021-09-09 ENCOUNTER — OFFICE VISIT (OUTPATIENT)
Dept: OBGYN CLINIC | Age: 36
End: 2021-09-09

## 2021-09-09 VITALS — DIASTOLIC BLOOD PRESSURE: 72 MMHG | SYSTOLIC BLOOD PRESSURE: 118 MMHG

## 2021-09-09 NOTE — PROGRESS NOTES
Here for bandage removal, incision looked good. No drainage noticed. Slight bruising under incision. Cleaned with peroxide and steri strips placed on to incision. Instructed to keep area clean and dry. Will schedule appointment for 1 week for post partum visit.

## 2021-09-16 ENCOUNTER — TELEMEDICINE (OUTPATIENT)
Dept: OBGYN CLINIC | Age: 36
End: 2021-09-16

## 2021-09-16 DIAGNOSIS — Z98.891 S/P CESAREAN SECTION: Primary | ICD-10-CM

## 2021-09-16 PROCEDURE — 99024 POSTOP FOLLOW-UP VISIT: CPT | Performed by: OBSTETRICS & GYNECOLOGY

## 2021-09-16 NOTE — PROGRESS NOTES
Bailey Valle  2:58 PM  21    The patient was seen. She has no chief complaints today. She delivered by  section on 9/3/21. She is not breast feeding and there is not any signs or symptoms of mastitis. She does not have any signs or symptoms of post partum depression. She denies any suicidal thoughts with a plan, intent to harm others and delusional ideas. Today her lochia is light she denies any dizziness or shortness of breath. Her pregnancy was complicated by:   Patient Active Problem List    Diagnosis Date Noted    Postpartum state     Dehiscence of old uterine scar with extension before onset of labor, antepartum, third trimester 2021    Family history of breast cancer in sister 2021    Other specified personal risk factors, not elsewhere classified 2021     Overview Note:     Discussed at length the risks and benefits of concurrent ovarian cancer risk reducing bilateral salpingectomy with patient's upcoming scheduled abdominal or pelvic surgery as this patient is at average risk for development of ovarian cancer. Advised patient that the primary benefit of such surgery is up to a 65% reduction in future risk of ovarian cancer. Finally, patient has been thoroughly counseled regarding the consequence of the loss of fertility following this procedure. Patient understands that this loss of fertility cannot be reversed and has expressed via verbal and written consent that her wishes are to proceed with this surgery for the purposes of reducing risk for ovarian cancer.  Encounter for prophylactic surgery for risk factor related to malignant neoplasm of ovary 2021     Overview Note:     Discussed at length the risks and benefits of concurrent ovarian cancer risk reducing bilateral salpingectomy with patient's upcoming scheduled abdominal or pelvic surgery as this patient is at average risk for development of ovarian cancer.  Advised patient that the primary benefit of such surgery is up to a 65% reduction in future risk of ovarian cancer. Finally, patient has been thoroughly counseled regarding the consequence of the loss of fertility following this procedure. Patient understands that this loss of fertility cannot be reversed and has expressed via verbal and written consent that her wishes are to proceed with this surgery for the purposes of reducing risk for ovarian cancer.  Hyperthyroidism (no meds) 2018    RLTCS 18 M Apg 8/9 Wt 8#1 2018    Hx of preeclampsia (G2) 2018    Hx of preeclampsia, prior pregnancy, currently pregnant, second trimester 2018    Prior CS x 2 10/01/2014    R complex Adnexal cyst (2.74 x 1.41 x 1.52cm 10/25/2013     Overview Note:     2.74 x 1.41 x 1.52cm           She does admit to having good home support. Her bowels are regular and she denies any urinary tract symptomology. OB History    Para Term  AB Living   4 4 4 0 0 4   SAB TAB Ectopic Molar Multiple Live Births   0 0 0 0 0 4           Last menstrual period 2020, currently breastfeeding. Abdomen: Soft and non-tender; good bowel sounds; no guarding, rebound or rigidity; no CVA tenderness bilaterally. Incision: Clean, Dry and Intact without signs or symptoms of infection. Extremities: No calf tenderness bilaterally. DTR 2/4 bilaterally. No edema. Assessment:   Diagnosis Orders   1. S/P  section       No chief complaint on file. Plan:  1. Return to the office in 3 weeks  2. Signs & Symptoms of mastitis reviewed; notify if occurs  3. Secondary smoke risks reviewed. Increased risks of respiratory problems, Sudden     infant death syndrome, and potential malignancies. 4. Abstinence  5. Family planning counseling and STD counseling completed - s/p rrBS  6. Continue with post operative restrictions  7. No lifting or [place]  8. Patient was seen with total face to face time of 20 minutes. More than 50% of this visit was on counseling and education regarding her    Diagnosis Orders   1. S/P  section      and her options. She was also counseled on her preventative health maintenance recommendations and follow-up.

## 2023-05-16 ENCOUNTER — TELEPHONE (OUTPATIENT)
Dept: OBGYN CLINIC | Age: 38
End: 2023-05-16

## 2023-07-26 NOTE — OP NOTE
[facility]  OBSTETRICAL  PHYSICIAN POST-OPERATIVE  NOTE:      Patient Name: Melyssa Mitchell  Patient : 1985  Room/Bed: 8318/9121-24  Admission Date/Time: 9/3/2021  6:18 AM  Primary Care Physician: Khris Matamoros MD  MRN #: [id number]  [facility] #: [id number]        Date: 2021  Time: 9:39 PM        Pre-operative Diagnosis:   Melyssa Mitchell is a 28 y.o. female at 36w4d [id number]  History of C/S x3  History of uterine window/uterine dehiscence      Term pregnancy  Patient Active Problem List    Diagnosis Date Noted    Postpartum state     Dehiscence of old uterine scar with extension before onset of labor, antepartum, third trimester 2021    Family history of breast cancer in sister 2021    Other specified personal risk factors, not elsewhere classified 2021     Overview Note:     Discussed at length the risks and benefits of concurrent ovarian cancer risk reducing bilateral salpingectomy with patient's upcoming scheduled abdominal or pelvic surgery as this patient is at average risk for development of ovarian cancer. Advised patient that the primary benefit of such surgery is up to a 65% reduction in future risk of ovarian cancer. Finally, patient has been thoroughly counseled regarding the consequence of the loss of fertility following this procedure. Patient understands that this loss of fertility cannot be reversed and has expressed via verbal and written consent that her wishes are to proceed with this surgery for the purposes of reducing risk for ovarian cancer.  Encounter for prophylactic surgery for risk factor related to malignant neoplasm of ovary 2021     Overview Note:     Discussed at length the risks and benefits of concurrent ovarian cancer risk reducing bilateral salpingectomy with patient's upcoming scheduled abdominal or pelvic surgery as this patient is at average risk for development of ovarian cancer.  Advised patient that the primary sterile conditions: No      Complications:  None      Findings/ Delivery Summary:  Mother's Information    Labor Events     labor?: No     Mother Delivery Information    Surgical or Additional Est. Blood Loss (mL): 0 (View Only): Edit in Flowsheets   Combined Est. Blood Loss (mL): 0        Mina Posadas Girl Nicolle Coto [[id number]]    Labor Events     labor?: No   steroids?: None  Cervical ripening date/time:     Antibiotics received during labor?: No  Rupture date/time: 9/3/21 09:58:00   Rupture type: Artificial=AROM  Fluid color: Clear  Fluid odor: None  Induction: None  Augmentation: None  Labor complications: None          Anesthesia    Method: Spinal     Assisted Delivery Details    Forceps attempted?: No  Vacuum extractor attempted?: No     Document Additional Attempt       Document Additional Attempt             Shoulder Dystocia    Shoulder dystocia present?: No  Add Second Maneuver  Add Third Maneuver  Add Fourth Maneuver  Add Fifth Maneuver  Add Sixth Maneuver  Add Seventh Maneuver  Add Eighth Maneuver  Add Ninth Maneuver     [place] Information    Head delivery date/time: 9/3/2021 09:00:00   Changing the 's delivery date/time could affect patient care.:    Delivery date/time:  9/3/21 0900   Delivery type: , Low Transverse    Details:  Trial of labor?: No    categorization: Repeat    priority: Scheduled   Indications for : Prior Uterine Surgery   Skin Incision Type: Pfannenstiel   Uterine Incision: Low Transverse         Delivery Providers    Delivering clinician: Steve Martines DO   Provider Role    Sharif Ambrocio       Placenta    Date/time: 9/3/2021 09:02:14  Removal: Manual Removal  Appearance: Intact  Disposition: Pathology     Apgars    Living status: Living  Apgars   1 Minute:  5 Minute:  10 Minute 15 Minute 20 Minute   Skin Color: 1  1       Heart Rate: 2  2       Reflex Irritability: 1  1       Muscle Tone: 2  2       Respiratory Effort: 2  2       Total: 8  8               Apgars Assigned By: DR. Gonzales Burns     Cecil Measurements    Weight: 3289 g Length: 50.8 cm   Head circumference: 36 cm Chest circumference: 34.5 cm   Abdominal girth: 34.5 cm       Delivery Information    Surgical or additional est. blood loss (mL): 0 (View Only): Edit in Flowsheets   Combined est. blood loss (mL): 0     Labor Length    3rd stage: 0h 02m              Living  infant(s) and Female    Cephalic  right occiput anterior  Other:       Amniotic Fluid was: Clear  A Nuchal Cord: was not present  A Spontaneous Cry Was Noted: Yes  The Baby: was suctioned        The Placenta Was Removed:  intact and that the umbilical cord had three vessels noted    cord gasses were obtained and sent to the lab, cord blood was obtained and sent to the lab and Pitocin, 20 milliunits in 1 liter of ringers lactate was administered, wide open, to assist with uterine contraction    The umbilical cord had delayed clamping of 1 minute: Yes    The Maternal Adnexa was Visualized. There were not any adnexal masses. Body mass index is 27.46 kg/m². (Wound Vac indicated for BMI Value>35)    Wound Vac Applied to Incision: No      Specimen:  Placenta sent to pathology yes  * No specimens in log *     Condition:  infant stable to general nursery and mother stable    Blood Type and Rh: O POSITIVE        Rubella Immunity Status:    Rubella Antibody, IGG   Date Value Ref Range Status   2021 116.6 IU/mL Final     Comment:                 REFERENCE RANGE:  <5.0       NON-REACTIVE (non-immune)  5.0 TO 9.9 EQUIVOCAL  >=10.0     REACTIVE     (immune)             All Sponge Counts Were Correct x 3 calls-Prior to closure of Peritoneum, Fascia, and Skin Layers: Yes        Attending Attestation: I was present and scrubbed for the entire procedure.     SCIP will be utilized for Antibiotics: Yes  Allergies as of 2021 - Fully Reviewed 2021   Allergen Reaction Noted    Amoxicillin-pot clavulanate  2013    Codeine Rash 2015    Sulfa antibiotics Rash 2013         EPC's in  Place for DVT prophylaxis      Procedure: (Understanding of limitations from template op-reports exist)  Poly Allen is a 28 y.o. female [id number] @ 38w3d for  delivery. The risks, benefits, complications, alternative treatment options, and expected outcomes were discussed with the patient. Risks of surgery were discussed, including but not limited to: pain, bleeding, need for blood transfusion, infection, injury to internal organs including intestines, bladder, uterus, fallopian tubes, ovaries and rarely injury to the fetus. Postoperative complications including pain, bleeding, need for blood transfusion, infection, re-operation, infection of the incisions were also explained. The patient verbalized understanding and agreed to proceed, giving informed consent. The patient was taken to Operating Room, identified as Poly Allen and the procedure verified as  Delivery. A Time Out was held and the above information confirmed. Procedure Details:  After Spinal Anesthetic with Duramorph was instilled in the seated position the patient was returned to the supine position with a wedge placed under her right hip. FHT's were obtained, the patient was prepped and draped in the usual sterile manner. A three minute drape delay was completed. The bladder was draining clear urine. SCIP antibiotics were infused, EPC's were in place and operating. A time out was completed. There was an adequate skin check both high and low. A Pfannenstiel incision was made with a #10 scalpel and carried down to the fascia with bovie cautery. Fascial incision was made and extended transversely. The fascia was  from the underlying rectus tissue superiorly and inferiorly. The peritoneum was identified and entered superiorly.   The peritoneal incision was extended superiorly and inferiorly, care not to involve the bowel or the bladder. The Rylee KEYONA retractor was placed inferiorly into the incision. Lower uterine segment was noted to have uterine window, with visualization of fetus through thin layer or uterine serosa. A low transverse uterine incision was made and the uterine cavity was entered. This incision was extended using digital dissection in a cephalad to caudad manner. A live female infant was delivered without complications. The head was delivered through the incision and fundal pressure was used for the remaining body of the . There was not a nuchal cord. The  had bulb suction of the mouth and nares. There was a spontaneous cry. The infant was vigorous  and there was delayed cord clamping of 1 minute. Please find the  Apgar scores and weight above in the delivery summary. The umbilical cord was then clamped and cut, the infant was handed off to the awaiting neonatology team staff member attending the delivery. Then cord specimen and cord blood was collected and the placenta was delivered using gentle traction and fundal massage, it was intact and appeared normal.  The uterus was cleared of all clots and debris and was firm and contracted. IV Pitocin was infusing. An outflow tract was confirmed. The uterine incision was closed with running locked sutures of 0 Vicryl, starting at each corner with figure of \"8's\" and moving to the midline. Excellent hemostasis was observed. Gutters were cleared of all clots and debris. The pelvis was irrigated with warm, sterile water. Uterine incision was reinspected and hemostatic. The uterus, bilateral tubes and ovaries were normal.     The uterus was exteriorized to gain access to the bilateral fallopian tubes. The fallopian tube on the right was mobilized and grasped with a césar clamp.  The mesosalpinx was tented and utilizing the Ligasure along the mesosalpinx, the length of the instrument jaws, and then re approximated more anteriorly to just below the fallopian tube staying on the mesosalpinx. This was completed in a stepwise fashion with each segment being released by the trigger blade at the most proximal edge to the fallopian tube. This continued the entire length of the fallopian tube. Excellent hemostasis was achieved along the entire resection line. The remaining stump had a 0 Vicryl tie placed across its base. The same procedure was completed on the contralateral side. Re-inspection of the bilateral mesenteric edge and tubal stumps were hemostatic. Uterus was reintroduced to abdomen. The peritoneum was closed with 2-0 vicryl. Rectus abdominal muscles were reapproximated with 5 interupted 0-vycryl sutures. The fascia was then reapproximated with running sutures of 0 Vicryl, starting at each corner and moving to the midline. It was checked for any defects and there were none. The subcutaneous tissue was irrigated, any bleeding sites were cauterized. The skin was closed with insorb stapler,  It was dressed with Silver dressing  Sponge, Needle and instrument counts were called for and found to be correct prior to closure of the peritoneum, fascia, and skin layers. The urine was clear in the tubing and the bag at the end of the procedure. The patient received preoperative antibiotics and intraoperative analgesic. EPC's were in place at the beginning of the case. Patient was returned to her LDRP in stable condition. See orders. Surgical Assistant documentation TUSHAR CORTEZ:  The assistant surgeon was present to assist in the surgery and to give adequate visualization and intervene with occasional clamping and suturing so the procedure could be completed in a safe manner with limited risks to the patient. The patients co-morbidities identified in the History & Physical required the need for additional trained personnel to complete this procedure.  There are NO RESIDENTS present for obstetrical cases at this [facility].      I understand that section 1842(b)(7)(D) of the Social Security Act generally prohibits  Medicare physician fee schedule payment for the services of assistants at surgery in teaching  hospitals when qualified residents are available to furnish such services. I certify that the  services for which payment is to be claimed were medically necessary and that no qualified resident  was available to perform the services in a safe manner without another surgeon present to directly view their technique while developing their skill set and intervene when necessary to ensure the best outcome for the patient with the least risk of morbidity. I further understand that these services are subject to  post-payment review by the Medicare carrier.         Attending's Name: Ange Freed DO    Physician Completing Document: Ange Freed DO    Electronically signed by Ange Freed DO on 9/6/2021 at 9:39 PM Quality 226: Preventive Care And Screening: Tobacco Use: Screening And Cessation Intervention: Patient screened for tobacco use and is an ex/non-smoker Detail Level: Detailed

## 2023-09-19 ENCOUNTER — OFFICE VISIT (OUTPATIENT)
Dept: OBGYN CLINIC | Age: 38
End: 2023-09-19
Payer: COMMERCIAL

## 2023-09-19 ENCOUNTER — HOSPITAL ENCOUNTER (OUTPATIENT)
Age: 38
Setting detail: SPECIMEN
Discharge: HOME | End: 2023-09-19

## 2023-09-19 VITALS
HEIGHT: 64 IN | DIASTOLIC BLOOD PRESSURE: 80 MMHG | SYSTOLIC BLOOD PRESSURE: 122 MMHG | WEIGHT: 136 LBS | BODY MASS INDEX: 23.22 KG/M2

## 2023-09-19 DIAGNOSIS — E05.90 HYPERTHYROIDISM: ICD-10-CM

## 2023-09-19 DIAGNOSIS — Z80.3 FAMILY HISTORY OF BREAST CANCER IN MOTHER: ICD-10-CM

## 2023-09-19 DIAGNOSIS — Z91.89 AT HIGH RISK FOR BREAST CANCER: ICD-10-CM

## 2023-09-19 DIAGNOSIS — Z01.419 WELL WOMAN EXAM WITH ROUTINE GYNECOLOGICAL EXAM: Primary | ICD-10-CM

## 2023-09-19 DIAGNOSIS — Z12.31 ENCOUNTER FOR SCREENING MAMMOGRAM FOR MALIGNANT NEOPLASM OF BREAST: ICD-10-CM

## 2023-09-19 LAB — TSH SERPL DL<=0.05 MIU/L-ACNC: 0.5 UIU/ML (ref 0.3–5)

## 2023-09-19 PROCEDURE — 99395 PREV VISIT EST AGE 18-39: CPT | Performed by: NURSE PRACTITIONER

## 2023-09-19 SDOH — ECONOMIC STABILITY: INCOME INSECURITY: HOW HARD IS IT FOR YOU TO PAY FOR THE VERY BASICS LIKE FOOD, HOUSING, MEDICAL CARE, AND HEATING?: NOT HARD AT ALL

## 2023-09-19 SDOH — ECONOMIC STABILITY: FOOD INSECURITY: WITHIN THE PAST 12 MONTHS, YOU WORRIED THAT YOUR FOOD WOULD RUN OUT BEFORE YOU GOT MONEY TO BUY MORE.: NEVER TRUE

## 2023-09-19 SDOH — ECONOMIC STABILITY: HOUSING INSECURITY
IN THE LAST 12 MONTHS, WAS THERE A TIME WHEN YOU DID NOT HAVE A STEADY PLACE TO SLEEP OR SLEPT IN A SHELTER (INCLUDING NOW)?: NO

## 2023-09-19 SDOH — ECONOMIC STABILITY: FOOD INSECURITY: WITHIN THE PAST 12 MONTHS, THE FOOD YOU BOUGHT JUST DIDN'T LAST AND YOU DIDN'T HAVE MONEY TO GET MORE.: NEVER TRUE

## 2023-09-19 ASSESSMENT — PATIENT HEALTH QUESTIONNAIRE - PHQ9
1. LITTLE INTEREST OR PLEASURE IN DOING THINGS: 0
SUM OF ALL RESPONSES TO PHQ QUESTIONS 1-9: 0
SUM OF ALL RESPONSES TO PHQ9 QUESTIONS 1 & 2: 0
2. FEELING DOWN, DEPRESSED OR HOPELESS: 0
SUM OF ALL RESPONSES TO PHQ QUESTIONS 1-9: 0

## 2023-09-19 ASSESSMENT — ENCOUNTER SYMPTOMS
NAUSEA: 0
COUGH: 0
ABDOMINAL PAIN: 0
SHORTNESS OF BREATH: 0
VOMITING: 0
COLOR CHANGE: 0

## 2023-09-19 NOTE — PROGRESS NOTES
Lena Plata is a 40 y.o.  here for her annual exam.  The patient was seen and examined. The patients past medical, surgical, social and family history were reviewed. Current medications and allergies were reviewed, and documented in the chart. She is  they have 4 children. Tobacco abuse No    Last PAP: -negative, hx of abnormal PAP no  Family hx uterine or ovarian cancer-denies   Family hx of breast cancer -Mother had hx of breast cancer in her 46s, never had genetic testing. family hx colon cancer -denies        Sexually active: yes - ,  Dyspareunia: No, Vaginal discharge: no,  UTI symptoms: no, voiding difficulties: no, bowels regular:Yes bloating:no      Menstrual history:  Menarche age- [age], cycle every  28 days,  lasts 3 days.     Birth control: bilateral salpingectomy with c/s in   LMP: 23    OB History    Para Term  AB Living   4 4 4 0 0 4   SAB IAB Ectopic Molar Multiple Live Births   0 0 0   0 4      # Outcome Date GA Lbr Usman/2nd Weight Sex Delivery Anes PTL Lv   4 Term 21 38w3d  7 lb 4 oz (3.289 kg) F CS-LTranv Spinal N MARLEN   3 Term 18 38w1d  8 lb 1.5 oz (3.671 kg) M CS-LTranv   MARLEN   2 Term 02/03/15 38w0d  7 lb 12.5 oz (3.53 kg) M CS-LTranv Spinal  MARLEN   1 Term 2014 39w0d  8 lb (3.629 kg) F CS-LTranv  N MARLEN       Vitals:    23 0925   BP: 122/80   Site: Right Upper Arm   Position: Sitting   Cuff Size: Medium Adult   Weight: 136 lb (61.7 kg)   Height: 5' 4\" (1.626 m)       Wt Readings from Last 3 Encounters:   23 136 lb (61.7 kg)   21 160 lb (72.6 kg)   21 160 lb (72.6 kg)     Past Medical History:   Diagnosis Date    Hyperthyroidism     referral sent to dr. Rafael Lucero     Seasonal allergies                                                                    Past Surgical History:   Procedure Laterality Date     SECTION  2014     SECTION N/A 9/3/2021    REPEAT  SECTION WITH POSSIBLE BILATERAL

## 2023-09-21 LAB
HPV I/H RISK 4 DNA CVX QL NAA+PROBE: NOT DETECTED
HPV SAMPLE: NORMAL
HPV, INTERPRETATION: NORMAL
HPV16 DNA CVX QL NAA+PROBE: NOT DETECTED
HPV18 DNA CVX QL NAA+PROBE: NOT DETECTED
SPECIMEN DESCRIPTION: NORMAL

## 2023-09-25 LAB — CYTOLOGY REPORT: NORMAL

## 2023-11-11 NOTE — PROGRESS NOTES
Ht 5'4
pregnancy, currently pregnant, second trimester 07/17/2018    Normal pregnancy in multigravida in second trimester 05/24/2018    Preeclampsia in postpartum period 02/04/2015    Elv 1 hr, nml 3 hr 01/05/2015     Normal 3hr GTT      Persistent R umbilical vein 91/76/2177     Persistent R umbilical vein      Prior CS x 1  10/01/2014    Encounter for supervision of other normal pregnancy 09/04/2014     Replacing Inactive Diagnoses      Short interval preg 08/07/2014    Adnexal cyst 10/25/2013     L      Prior large fetal biometric parameters 08/22/2013        Diagnosis Orders   1. Encounter for supervision of other normal pregnancy in second trimester     2. Low implantation of placenta without hemorrhage     3. 20 weeks gestation of pregnancy           Plan:  The patient will return to the office for her next visit in 4 weeks. See antepartum flow sheet. Follow up MFM for low lying placenta.
Opt out

## 2025-02-13 NOTE — TELEPHONE ENCOUNTER
----- Message from Padma Lockwood DO sent at 9/11/2018  5:13 PM EDT -----  Please call and notify patient that she has passed the 1 hour gtt. Also her TSH is normal.   Lastly, please notify her of anemia of 10.1 likely secondary to pregnancy. Recommend Iron supplementation 325mg BID with food. Also colace for constipation that can be side effect of iron. Thank you.
Informed of results please sign RX
show

## 2025-08-27 ENCOUNTER — OFFICE VISIT (OUTPATIENT)
Dept: OBGYN CLINIC | Age: 40
End: 2025-08-27
Payer: COMMERCIAL

## 2025-08-27 ENCOUNTER — HOSPITAL ENCOUNTER (OUTPATIENT)
Age: 40
Setting detail: SPECIMEN
Discharge: HOME | End: 2025-08-27

## 2025-08-27 VITALS
WEIGHT: 136.4 LBS | BODY MASS INDEX: 23.29 KG/M2 | DIASTOLIC BLOOD PRESSURE: 84 MMHG | HEIGHT: 64 IN | SYSTOLIC BLOOD PRESSURE: 122 MMHG

## 2025-08-27 DIAGNOSIS — Z12.31 VISIT FOR SCREENING MAMMOGRAM: ICD-10-CM

## 2025-08-27 DIAGNOSIS — Z01.419 WELL WOMAN EXAM WITH ROUTINE GYNECOLOGICAL EXAM: Primary | ICD-10-CM

## 2025-08-27 PROCEDURE — 99395 PREV VISIT EST AGE 18-39: CPT | Performed by: NURSE PRACTITIONER

## 2025-08-27 SDOH — ECONOMIC STABILITY: FOOD INSECURITY: WITHIN THE PAST 12 MONTHS, THE FOOD YOU BOUGHT JUST DIDN'T LAST AND YOU DIDN'T HAVE MONEY TO GET MORE.: PATIENT DECLINED

## 2025-08-27 SDOH — ECONOMIC STABILITY: FOOD INSECURITY: WITHIN THE PAST 12 MONTHS, YOU WORRIED THAT YOUR FOOD WOULD RUN OUT BEFORE YOU GOT MONEY TO BUY MORE.: PATIENT DECLINED

## 2025-08-27 ASSESSMENT — ENCOUNTER SYMPTOMS
NAUSEA: 0
COLOR CHANGE: 0
SHORTNESS OF BREATH: 0
COUGH: 0
VOMITING: 0

## 2025-08-27 ASSESSMENT — PATIENT HEALTH QUESTIONNAIRE - PHQ9: DEPRESSION UNABLE TO ASSESS: PT REFUSES

## 2025-08-29 LAB
HPV I/H RISK 4 DNA CVX QL NAA+PROBE: NOT DETECTED
HPV SAMPLE: NORMAL
HPV, INTERPRETATION: NORMAL
HPV16 DNA CVX QL NAA+PROBE: NOT DETECTED
HPV18 DNA CVX QL NAA+PROBE: NOT DETECTED
SPECIMEN DESCRIPTION: NORMAL

## (undated) DEVICE — SURGICAL PROCEDURE PACK C SECT B

## (undated) DEVICE — SURGICAL PROCEDURE PACK C SECT ST CHARLES SCMH

## (undated) DEVICE — MEDI-VAC YANK SUCT HNDL W/TPRD BULBOUS TIP & NON-CONDUCTIVE: Brand: CARDINAL HEALTH

## (undated) DEVICE — SUTURE VCRL SZ 4-0 L27IN ABSRB UD L19MM FS-2 3/8 CIR REV J422H

## (undated) DEVICE — DISCONTINUED USE 112613 SWABSTICK ADHESIVE  BENZOIN TINCTURE LF

## (undated) DEVICE — SUTURE ABSORBABLE BRAIDED 2-0 CT-1 27 IN UD VICRYL J259H

## (undated) DEVICE — CATHETERIZATION KIT PEDIATRIC 16 FR 5 CC INDWL INF CTRL

## (undated) DEVICE — CHLORAPREP 26ML ORANGE

## (undated) DEVICE — KENDALL SCD EXPRESS SLEEVES, KNEE LENGTH, MEDIUM: Brand: KENDALL SCD

## (undated) DEVICE — SUTURE VCRL SZ 0 L36IN ABSRB UD L36MM CT-1 1/2 CIR J946H

## (undated) DEVICE — GLOVE ORANGE PI 7   MSG9070

## (undated) DEVICE — SOLUTION IRRIG 1500ML STRL H2O USP POUR PLAS BTL